# Patient Record
Sex: MALE | Race: BLACK OR AFRICAN AMERICAN | Employment: UNEMPLOYED | URBAN - METROPOLITAN AREA
[De-identification: names, ages, dates, MRNs, and addresses within clinical notes are randomized per-mention and may not be internally consistent; named-entity substitution may affect disease eponyms.]

---

## 2019-11-01 ENCOUNTER — ANESTHESIA EVENT (OUTPATIENT)
Dept: ENDOSCOPY | Age: 64
End: 2019-11-01
Payer: MEDICARE

## 2019-11-01 ENCOUNTER — HOSPITAL ENCOUNTER (OUTPATIENT)
Age: 64
Setting detail: OUTPATIENT SURGERY
Discharge: HOME | End: 2019-11-01
Attending: SPECIALIST | Admitting: SPECIALIST
Payer: MEDICARE

## 2019-11-01 ENCOUNTER — ANESTHESIA (OUTPATIENT)
Dept: ENDOSCOPY | Age: 64
End: 2019-11-01
Payer: MEDICARE

## 2019-11-01 VITALS
SYSTOLIC BLOOD PRESSURE: 115 MMHG | DIASTOLIC BLOOD PRESSURE: 79 MMHG | WEIGHT: 170.86 LBS | BODY MASS INDEX: 25.89 KG/M2 | RESPIRATION RATE: 18 BRPM | HEART RATE: 66 BPM | OXYGEN SATURATION: 100 % | HEIGHT: 68 IN | TEMPERATURE: 97.9 F

## 2019-11-01 PROCEDURE — 74011250636 HC RX REV CODE- 250/636: Performed by: NURSE ANESTHETIST, CERTIFIED REGISTERED

## 2019-11-01 PROCEDURE — 76040000019: Performed by: SPECIALIST

## 2019-11-01 PROCEDURE — 77030013992 HC SNR POLYP ENDOSC BSC -B: Performed by: SPECIALIST

## 2019-11-01 PROCEDURE — 76060000031 HC ANESTHESIA FIRST 0.5 HR: Performed by: SPECIALIST

## 2019-11-01 PROCEDURE — 88305 TISSUE EXAM BY PATHOLOGIST: CPT

## 2019-11-01 PROCEDURE — 74011000250 HC RX REV CODE- 250: Performed by: NURSE ANESTHETIST, CERTIFIED REGISTERED

## 2019-11-01 PROCEDURE — 74011250637 HC RX REV CODE- 250/637: Performed by: PHYSICIAN ASSISTANT

## 2019-11-01 PROCEDURE — 74011250636 HC RX REV CODE- 250/636: Performed by: PHYSICIAN ASSISTANT

## 2019-11-01 RX ORDER — PROPOFOL 10 MG/ML
INJECTION, EMULSION INTRAVENOUS AS NEEDED
Status: DISCONTINUED | OUTPATIENT
Start: 2019-11-01 | End: 2019-11-01 | Stop reason: HOSPADM

## 2019-11-01 RX ORDER — LIDOCAINE HYDROCHLORIDE 20 MG/ML
INJECTION, SOLUTION EPIDURAL; INFILTRATION; INTRACAUDAL; PERINEURAL AS NEEDED
Status: DISCONTINUED | OUTPATIENT
Start: 2019-11-01 | End: 2019-11-01 | Stop reason: HOSPADM

## 2019-11-01 RX ORDER — ATROPINE SULFATE 0.1 MG/ML
0.5 INJECTION INTRAVENOUS
Status: DISCONTINUED | OUTPATIENT
Start: 2019-11-01 | End: 2019-11-01 | Stop reason: HOSPADM

## 2019-11-01 RX ORDER — EPINEPHRINE 0.1 MG/ML
1 INJECTION INTRACARDIAC; INTRAVENOUS
Status: DISCONTINUED | OUTPATIENT
Start: 2019-11-01 | End: 2019-11-01 | Stop reason: HOSPADM

## 2019-11-01 RX ORDER — PROPOFOL 10 MG/ML
INJECTION, EMULSION INTRAVENOUS
Status: DISCONTINUED | OUTPATIENT
Start: 2019-11-01 | End: 2019-11-01 | Stop reason: HOSPADM

## 2019-11-01 RX ORDER — MIDAZOLAM HYDROCHLORIDE 1 MG/ML
.25-5 INJECTION, SOLUTION INTRAMUSCULAR; INTRAVENOUS AS NEEDED
Status: DISCONTINUED | OUTPATIENT
Start: 2019-11-01 | End: 2019-11-01 | Stop reason: HOSPADM

## 2019-11-01 RX ORDER — SODIUM CHLORIDE 9 MG/ML
INJECTION, SOLUTION INTRAVENOUS
Status: DISCONTINUED | OUTPATIENT
Start: 2019-11-01 | End: 2019-11-01 | Stop reason: HOSPADM

## 2019-11-01 RX ORDER — OMEPRAZOLE 20 MG/1
20 CAPSULE, DELAYED RELEASE ORAL DAILY
COMMUNITY

## 2019-11-01 RX ORDER — NALOXONE HYDROCHLORIDE 0.4 MG/ML
0.4 INJECTION, SOLUTION INTRAMUSCULAR; INTRAVENOUS; SUBCUTANEOUS
Status: DISCONTINUED | OUTPATIENT
Start: 2019-11-01 | End: 2019-11-01 | Stop reason: HOSPADM

## 2019-11-01 RX ORDER — FENTANYL CITRATE 50 UG/ML
25 INJECTION, SOLUTION INTRAMUSCULAR; INTRAVENOUS AS NEEDED
Status: DISCONTINUED | OUTPATIENT
Start: 2019-11-01 | End: 2019-11-01 | Stop reason: HOSPADM

## 2019-11-01 RX ORDER — DEXTROMETHORPHAN/PSEUDOEPHED 2.5-7.5/.8
1.2 DROPS ORAL
Status: DISCONTINUED | OUTPATIENT
Start: 2019-11-01 | End: 2019-11-01 | Stop reason: HOSPADM

## 2019-11-01 RX ORDER — SODIUM CHLORIDE 9 MG/ML
50 INJECTION, SOLUTION INTRAVENOUS CONTINUOUS
Status: DISCONTINUED | OUTPATIENT
Start: 2019-11-01 | End: 2019-11-01 | Stop reason: HOSPADM

## 2019-11-01 RX ORDER — FLUMAZENIL 0.1 MG/ML
0.2 INJECTION INTRAVENOUS
Status: DISCONTINUED | OUTPATIENT
Start: 2019-11-01 | End: 2019-11-01 | Stop reason: HOSPADM

## 2019-11-01 RX ORDER — CHOLECALCIFEROL (VITAMIN D3) 125 MCG
1 CAPSULE ORAL DAILY
COMMUNITY
End: 2022-07-18

## 2019-11-01 RX ADMIN — SIMETHICONE 1.2 ML: 20 SUSPENSION/ DROPS ORAL at 07:18

## 2019-11-01 RX ADMIN — SODIUM CHLORIDE 50 ML/HR: 900 INJECTION, SOLUTION INTRAVENOUS at 07:00

## 2019-11-01 RX ADMIN — SODIUM CHLORIDE: 9 INJECTION, SOLUTION INTRAVENOUS at 07:00

## 2019-11-01 RX ADMIN — PROPOFOL 100 MG: 10 INJECTION, EMULSION INTRAVENOUS at 07:11

## 2019-11-01 RX ADMIN — PROPOFOL 120 MCG/KG/MIN: 10 INJECTION, EMULSION INTRAVENOUS at 07:11

## 2019-11-01 RX ADMIN — LIDOCAINE HYDROCHLORIDE 40 MG: 20 INJECTION, SOLUTION INTRAVENOUS at 07:11

## 2019-11-01 NOTE — PERIOP NOTES
0710  Anesthesia staff at patient's bedside administering anesthesia and monitoring patients vital signs throughout procedure. See anesthesia note.    0726  Endoscope was pre-cleaned at bedside immediately following procedure by Malina Denney endo tech. 0730  Patient tolerated procedure without problems. Abdomen soft and patient arousable and voices no complaints Report received from CRNA, see anesthesia note. Patient transported to endoscopy recovery area. Report given to Gibran Sheets RN.

## 2019-11-01 NOTE — ROUTINE PROCESS
Mariama Drake  1955  [id number]    Situation:  Verbal report received from: Alfredo Nelson RN  Procedure: Procedure(s):  COLONOSCOPY  ENDOSCOPIC POLYPECTOMY    Background:    Preoperative diagnosis: SCREENING  Postoperative diagnosis: Polyp    :  Dr. Nohemi Middleton  Assistant(s): Endoscopy Technician-1: John Paul ANDRES  Endoscopy RN-1: Flaca Bahena RN    Specimens:   ID Type Source Tests Collected by Time Destination   1 : transverse colon polyp Preservative Colon, Transverse  Chato Qureshi MD 11/1/2019 0720 Pathology     H. Pylori  no    Assessment:  Intra-procedure medications       Anesthesia gave intra-procedure sedation and medications, see anesthesia flow sheet yes    Intravenous fluids: NS@ KVO     Vital signs stable     Abdominal assessment: round and soft     Recommendation:  Discharge patient per MD order. Family or Friend   Permission to share finding with family or friend yes    Endoscopy discharge instructions have been reviewed and given to patient and spouse. The patient and spouse verbalized understanding and acceptance of instructions.

## 2019-11-01 NOTE — DISCHARGE INSTRUCTIONS
[address] [facility] LANG Casillas MD  [phone]      November 1, 2019    Camilla Gannon  YOB: 1955    COLONOSCOPY DISCHARGE INSTRUCTIONS    If there is redness at IV site you should apply warm compress to area. If redness or soreness persist contact Dr. Alem Casillas' or your primary care doctor. There may be a slight amount of blood passed from the rectum. Gaseous discomfort may develop, but walking, belching will help relieve this. You may not operate a vehicle for 12 hours  You may not operate machinery or dangerous appliances for rest of today  You may not drink alcoholic beverages for 12 hours  Avoid making any critical decisions for 24 hours    DIET:  You may resume your normal diet, but some patients find that heavy or large meals may lead to indigestion or vomiting. I suggest a light meal as first food intake. MEDICATIONS:  The use of some over-the-counter pain medication may lead to bleeding after colon biopsies or polyp removal.  Tylenol (also called acetaminophen) is safe to take even if you have had colonoscopy with polyp removal.  Based on the procedure you had today you may not safely take aspirin or aspirin-like products for the next ten (10) days. Remember that Tylenol (also called acetaminophen) is safe to take after colonoscopy even if you have had biopsies or polyps removed. ACTIVITY:  You may resume your normal household activities, but it is recommended that you spend the remainder of the day resting -  avoid any strenuous activity. CALL DR. Rubin Madrigal' OFFICE IF:  Increasing pain, nausea, vomiting  Abdominal distension (swelling)  Significant new or increased bleeding (oral or rectal)  Fever/Chills  Chest pain/shortness of breath                       Additional instructions:   No aspirin 10 days. We found one small polyp and removed that today.   I will contact you with the polyp results when available. It was an honor to be your doctor today. Please let me or my office staff know if you have any feedback about today's procedure. Ladi Greer MD    Colonoscopy saves lives, and can prevent colon cancer. Everyone aged 48 or older needs colonoscopy. Tell your family and friends: get the test!      Patient Education        Colon Polyps: Care Instructions  Your Care Instructions    Colon polyps are growths in the colon or the rectum. The cause of most colon polyps is not known, and most people who get them do not have any problems. But a certain kind can turn into cancer. For this reason, regular testing for colon polyps is important for people as they get older. It is also important for anyone who has an increased risk for colon cancer. Polyps are usually found through routine colon cancer screening tests. Although most colon polyps are not cancerous, they are usually removed and then tested for cancer. Screening for colon cancer saves lives because the cancer can usually be cured if it is caught early. If you have a polyp that is the type that can turn into cancer, you may need more tests to examine your entire colon. The doctor will remove any other polyps that he or she finds, and you will be tested more often. Follow-up care is a key part of your treatment and safety. Be sure to make and go to all appointments, and call your doctor if you are having problems. It's also a good idea to know your test results and keep a list of the medicines you take. How can you care for yourself at home? Regular exams to look for colon polyps are the best way to prevent polyps from turning into colon cancer. These can include stool tests, sigmoidoscopy, colonoscopy, and CT colonography. Talk with your doctor about a testing schedule that is right for you. To prevent polyps  There is no home treatment that can prevent colon polyps. But these steps may help lower your risk for cancer.   · Stay active. Being active can help you get to and stay at a healthy weight. Try to exercise on most days of the week. Walking is a good choice. · Eat well. Choose a variety of vegetables, fruits, legumes (such as peas and beans), fish, poultry, and whole grains. · Do not smoke. If you need help quitting, talk to your doctor about stop-smoking programs and medicines. These can increase your chances of quitting for good. · If you drink alcohol, limit how much you drink. Limit alcohol to 2 drinks a day for men and 1 drink a day for women. When should you call for help? Call your doctor now or seek immediate medical care if:    · You have severe belly pain.     · Your stools are maroon or very bloody.    Watch closely for changes in your health, and be sure to contact your doctor if:    · You have a fever.     · You have nausea or vomiting.     · You have a change in bowel habits (new constipation or diarrhea).     · Your symptoms get worse or are not improving as expected. Where can you learn more? Go to http://gail-milton.info/. Enter 95 [id number] in the search box to learn more about \"Colon Polyps: Care Instructions. \"  Current as of: December 19, 2018  Content Version: 12.2  © 7457-9777 [employer]. Care instructions adapted under license by [employer] (which disclaims liability or warranty for this information). If you have questions about a medical condition or this instruction, always ask your healthcare professional. [place] [address] any warranty or liability for your use of this information.

## 2019-11-01 NOTE — PROCEDURES
[address] [facility] LANG Delgado MD  [phone]      2019    Colonoscopy Procedure Note  Hardy Beckford  :  1955  Yudy Medical Record Number: [id number]    Indications:     Screening colonoscopy  PCP:  Dafne Melgoza MD  Anesthesia/Sedation: Conscious Sedation/Moderate Sedation/GETA, see notes  Endoscopist:  Dr. Alana Arntet  Complications:  None  Estimated Blood Loss:  None    Permit:  The indications, risks, benefits and alternatives were reviewed with the patient or their decision maker who was provided an opportunity to ask questions and all questions were answered. The specific risks of colonoscopy with conscious sedation were reviewed, including but not limited to anesthetic complication, bleeding, adverse drug reaction, missed lesion, infection, IV site reactions, and intestinal perforation which would lead to the need for surgical repair. Alternatives to colonoscopy including radiographic imaging, observation without testing, or laboratory testing were reviewed including the limitations of those alternatives. After considering the options and having all their questions answered, the patient or their decision maker provided both verbal and written consent to proceed. Procedure in Detail:  After obtaining informed consent, positioning of the patient in the left lateral decubitus position, and conduction of a pre-procedure pause or \"time out\" the endoscope was introduced into the anus and advanced to the cecum, which was identified by the ileocecal valve and appendiceal orifice. The quality of the colonic preparation was good. A careful inspection was made as the colonoscope was withdrawn, findings and interventions are described below. Findings:   6mm polyp in the transvese colon removed with cold snare and all samples retrieved. Hemostasis confirmed.       Specimens: See above    Complications:   None; patient tolerated the procedure well. Impression:  Polyp x1    Recommendations:     - Await pathology. Thank you for entrusting me with this patient's care. Please do not hesitate to contact me with any questions or if I can be of assistance with any of your other patients' GI needs. Signed By: Abagail Jeans, MD                        November 1, 2019      Surgical assistant none. Implants none unless specified.

## 2019-11-01 NOTE — INTERVAL H&P NOTE
Pre-Endoscopy H&P Update  Chief complaint/HPI/ROS:  The indication for the procedure, the patient's history and the patient's current medications are reviewed prior to the procedure and that data is reported on the H&P to which this document is attached. Any significant complaints with regard to organ systems will be noted, and if not mentioned then a review of systems is not contributory. Past Medical History:   Diagnosis Date    Acid reflux     Arthritis     Asthma     GERD (gastroesophageal reflux disease)     High cholesterol     Hypertension     last took medication 2012    Ill-defined condition     high cholesterol-    Other ill-defined conditions(799.89) 2013    pancreatitis- hospitalized at [facility] Other ill-defined conditions(799.89)     hip avascular necrosis- per MRI    Pancreatitis       Past Surgical History:   Procedure Laterality Date   [facility] ORTHOPAEDIC  7-2014    right hip core decompression    HX PROSTATE SURGERY  2016     Social   Social History     Tobacco Use    Smoking status: Never Smoker    Smokeless tobacco: Never Used   Substance Use Topics    Alcohol use: Yes     Alcohol/week: 28.0 standard drinks     Types: 28 Cans of beer per week      Family History   Problem Relation Age of Onset    Diabetes Mother     Hypertension Mother       No Known Allergies   Prior to Admission Medications   Prescriptions Last Dose Informant Patient Reported? Taking? OTHER 10/30/2019  Yes Yes   Sig: Indications: a blood pressure med, doesn't know name   alendronate (FOSAMAX) 10 mg tablet 10/25/2019 at Unknown time  Yes Yes   Sig: Take 70 mg by mouth Every Thursday. aspirin (ASPIRIN) 325 mg tablet Not Taking at Unknown time  No No   Sig: Take 1 tablet by mouth two (2) times a day. cholecalciferol, vitamin D3, (VITAMIN D3) 2,000 unit tab 10/30/2019  Yes No   Sig: Take 1 Tab by mouth daily.    diclofenac potassium (CATAFLAM) 50 mg tablet Not Taking at Unknown time  Yes No   Sig: Take 50 mg by mouth two (2) times a day. folic acid (FOLVITE) 1 mg tablet 10/25/2019 at Unknown time  Yes Yes   Sig: Take 1 mg by mouth daily. omeprazole (PRILOSEC) 20 mg capsule 10/30/2019  Yes Yes   Sig: Take 20 mg by mouth daily. Indications: gastroesophageal reflux disease   oxyCODONE IR (ROXICODONE) 5 mg immediate release tablet 10/28/2019  No No   Sig: Take 1 tablet by mouth every eight (8) hours as needed for Pain. oxyCODONE-acetaminophen (PERCOCET 7.5) 7.5-325 mg per tablet Not Taking at Unknown time  Yes No   Sig: Take 1 tablet by mouth every six (6) hours as needed for Pain. oxyCODONE-acetaminophen (PERCOCET 7.5) 7.5-325 mg per tablet Not Taking at Unknown time  No No   Sig: Take 1-2 tablets by mouth every four (4) hours as needed for Pain. promethazine (PHENERGAN) 25 mg tablet Not Taking at Unknown time  No No   Sig: Take 1 tablet by mouth every six (6) hours as needed for Nausea. silodosin (RAPAFLO) 8 mg capsule Not Taking at Unknown time  Yes No   Sig: Take 8 mg by mouth daily (with breakfast). simvastatin (ZOCOR) 20 mg tablet 10/30/2019  Yes No   Sig: Take 20 mg by mouth nightly. thiamine (VITAMIN B-1) 100 mg tablet Not Taking at Unknown time  Yes No   Sig: Take  by mouth daily. traMADol (ULTRAM) 50 mg tablet   No No   Sig: Take 1 tablet by mouth every six (6) hours as needed for Pain. Facility-Administered Medications: None       PHYSICAL EXAM:  The patient is examined immediately prior to the procedure. Visit Vitals  /80   Pulse 61   Temp 97.9 °F (36.6 °C)   Resp 21   Ht 5' 7.5\" (1.715 m)   Wt 77.5 kg (170 lb 13.7 oz)   SpO2 100%   BMI 26.36 kg/m²     Gen: Appears comfortable, no distress. Pulm: comfortable respirations with no abnormal audible breath sounds  HEART: well perfused, no abnormal audible heart sounds  GI: abdomen flat. PLAN:  Informed consent discussion held, patient afforded an opportunity to ask questions and all questions answered.   After being advised of the risks, benefits, and alternatives, the patient requested that we proceed and indicated so on a written consent form. Will proceed with procedure as planned.   Harriet Pizarro MD

## 2019-11-01 NOTE — ANESTHESIA PREPROCEDURE EVALUATION
Anesthetic History   No history of anesthetic complications            Review of Systems / Medical History  Patient summary reviewed, nursing notes reviewed and pertinent labs reviewed    Pulmonary            Asthma : well controlled       Neuro/Psych   Within defined limits          Comments: Chronic back pain Cardiovascular              Hyperlipidemia    Exercise tolerance: >4 METS     GI/Hepatic/Renal     GERD: well controlled           Endo/Other        Arthritis     Other Findings              Physical Exam    Airway  Mallampati: IV    Neck ROM: normal range of motion   Mouth opening: Normal     Cardiovascular    Rhythm: regular  Rate: normal         Dental    Dentition: Upper partial plate     Pulmonary  Breath sounds clear to auscultation               Abdominal         Other Findings            Anesthetic Plan    ASA: 2  Anesthesia type: MAC            Anesthetic plan and risks discussed with: Patient      Informed consent obtained.

## 2019-11-01 NOTE — ANESTHESIA POSTPROCEDURE EVALUATION
Procedure(s):  COLONOSCOPY  ENDOSCOPIC POLYPECTOMY. MAC    Anesthesia Post Evaluation      Multimodal analgesia: multimodal analgesia not used between 6 hours prior to anesthesia start to PACU discharge  Patient location during evaluation: PACU  Patient participation: complete - patient participated  Level of consciousness: awake and alert  Pain score: 0  Pain management: adequate  Airway patency: patent  Anesthetic complications: no  Cardiovascular status: hemodynamically stable and acceptable  Respiratory status: acceptable  Hydration status: acceptable  Comments: Patient seen and evaluated; no concerns. Post anesthesia nausea and vomiting:  none      Vitals Value Taken Time   /79 11/1/2019  7:50 AM   Temp 36.6 °C (97.9 °F) 11/1/2019  7:33 AM   Pulse 64 11/1/2019  7:52 AM   Resp 19 11/1/2019  7:52 AM   SpO2 100 % 11/1/2019  7:51 AM   Vitals shown include unvalidated device data.

## 2022-07-18 ENCOUNTER — HOSPITAL ENCOUNTER (OUTPATIENT)
Dept: PREADMISSION TESTING | Age: 67
Discharge: HOME | End: 2022-07-18
Payer: MEDICARE

## 2022-07-18 VITALS
SYSTOLIC BLOOD PRESSURE: 125 MMHG | BODY MASS INDEX: 28.53 KG/M2 | OXYGEN SATURATION: 98 % | WEIGHT: 181.8 LBS | HEIGHT: 67 IN | HEART RATE: 90 BPM | TEMPERATURE: 97.8 F | DIASTOLIC BLOOD PRESSURE: 76 MMHG | RESPIRATION RATE: 16 BRPM

## 2022-07-18 LAB
ABO + RH BLD: NORMAL
ALBUMIN SERPL-MCNC: 3.9 G/DL (ref 3.5–5)
ALBUMIN/GLOB SERPL: 1 {RATIO} (ref 1.1–2.2)
ALP SERPL-CCNC: 69 U/L (ref 45–117)
ALT SERPL-CCNC: 43 U/L (ref 12–78)
ANION GAP SERPL CALC-SCNC: 5 MMOL/L (ref 5–15)
APPEARANCE UR: CLEAR
AST SERPL-CCNC: 29 U/L (ref 15–37)
BACTERIA URNS QL MICRO: NEGATIVE /HPF
BASOPHILS # BLD: 0 K/UL (ref 0–0.1)
BASOPHILS NFR BLD: 1 % (ref 0–1)
BILIRUB SERPL-MCNC: 0.5 MG/DL (ref 0.2–1)
BILIRUB UR QL: NEGATIVE
BLOOD GROUP ANTIBODIES SERPL: NORMAL
BUN SERPL-MCNC: 14 MG/DL (ref 6–20)
BUN/CREAT SERPL: 15 (ref 12–20)
CALCIUM SERPL-MCNC: 9.6 MG/DL (ref 8.5–10.1)
CHLORIDE SERPL-SCNC: 107 MMOL/L (ref 97–108)
CO2 SERPL-SCNC: 30 MMOL/L (ref 21–32)
COLOR UR: NORMAL
CREAT SERPL-MCNC: 0.94 MG/DL (ref 0.7–1.3)
DIFFERENTIAL METHOD BLD: NORMAL
EOSINOPHIL # BLD: 0.2 K/UL (ref 0–0.4)
EOSINOPHIL NFR BLD: 3 % (ref 0–7)
EPITH CASTS URNS QL MICRO: NORMAL /LPF
ERYTHROCYTE [DISTWIDTH] IN BLOOD BY AUTOMATED COUNT: 13.6 % (ref 11.5–14.5)
GLOBULIN SER CALC-MCNC: 3.8 G/DL (ref 2–4)
GLUCOSE SERPL-MCNC: 93 MG/DL (ref 65–100)
GLUCOSE UR STRIP.AUTO-MCNC: NEGATIVE MG/DL
HCT VFR BLD AUTO: 43.8 % (ref 36.6–50.3)
HGB BLD-MCNC: 14.6 G/DL (ref 12.1–17)
HGB UR QL STRIP: NEGATIVE
HYALINE CASTS URNS QL MICRO: NORMAL /LPF (ref 0–2)
IMM GRANULOCYTES # BLD AUTO: 0 K/UL (ref 0–0.04)
IMM GRANULOCYTES NFR BLD AUTO: 0 % (ref 0–0.5)
KETONES UR QL STRIP.AUTO: NEGATIVE MG/DL
LEUKOCYTE ESTERASE UR QL STRIP.AUTO: NEGATIVE
LYMPHOCYTES # BLD: 2.5 K/UL (ref 0.8–3.5)
LYMPHOCYTES NFR BLD: 41 % (ref 12–49)
MCH RBC QN AUTO: 31.4 PG (ref 26–34)
MCHC RBC AUTO-ENTMCNC: 33.3 G/DL (ref 30–36.5)
MCV RBC AUTO: 94.2 FL (ref 80–99)
MONOCYTES # BLD: 0.5 K/UL (ref 0–1)
MONOCYTES NFR BLD: 8 % (ref 5–13)
NEUTS SEG # BLD: 2.8 K/UL (ref 1.8–8)
NEUTS SEG NFR BLD: 47 % (ref 32–75)
NITRITE UR QL STRIP.AUTO: NEGATIVE
NRBC # BLD: 0 K/UL (ref 0–0.01)
NRBC BLD-RTO: 0 PER 100 WBC
PH UR STRIP: 5.5 [PH] (ref 5–8)
PLATELET # BLD AUTO: 308 K/UL (ref 150–400)
PMV BLD AUTO: 9.3 FL (ref 8.9–12.9)
POTASSIUM SERPL-SCNC: 4.4 MMOL/L (ref 3.5–5.1)
PROT SERPL-MCNC: 7.7 G/DL (ref 6.4–8.2)
PROT UR STRIP-MCNC: NEGATIVE MG/DL
RBC # BLD AUTO: 4.65 M/UL (ref 4.1–5.7)
RBC #/AREA URNS HPF: NORMAL /HPF (ref 0–5)
SODIUM SERPL-SCNC: 142 MMOL/L (ref 136–145)
SP GR UR REFRACTOMETRY: 1.02 (ref 1–1.03)
SPECIMEN EXP DATE BLD: NORMAL
UA: UC IF INDICATED,UAUC: NORMAL
UROBILINOGEN UR QL STRIP.AUTO: 0.2 EU/DL (ref 0.2–1)
WBC # BLD AUTO: 6 K/UL (ref 4.1–11.1)
WBC URNS QL MICRO: NORMAL /HPF (ref 0–4)

## 2022-07-18 PROCEDURE — 85025 COMPLETE CBC W/AUTO DIFF WBC: CPT

## 2022-07-18 PROCEDURE — 81001 URINALYSIS AUTO W/SCOPE: CPT

## 2022-07-18 PROCEDURE — 80053 COMPREHEN METABOLIC PANEL: CPT

## 2022-07-18 PROCEDURE — 93005 ELECTROCARDIOGRAM TRACING: CPT

## 2022-07-18 PROCEDURE — 36415 COLL VENOUS BLD VENIPUNCTURE: CPT

## 2022-07-18 PROCEDURE — 86900 BLOOD TYPING SEROLOGIC ABO: CPT

## 2022-07-18 RX ORDER — ACETAMINOPHEN 500 MG
2000 TABLET ORAL DAILY
COMMUNITY

## 2022-07-18 RX ORDER — TAMSULOSIN HYDROCHLORIDE 0.4 MG/1
0.4 CAPSULE ORAL EVERY EVENING
COMMUNITY
End: 2022-09-14

## 2022-07-18 RX ORDER — HYDROCHLOROTHIAZIDE 12.5 MG/1
12.5 CAPSULE ORAL DAILY
COMMUNITY
End: 2022-08-16

## 2022-07-18 NOTE — H&P
History and Physical    Patient: Ryan Epperson MRN: [id number]  SSN: [id number]    YOB: 1955  Age: 74 y.o. Sex: male      CC: Prostate cancer    Subjective:      Ryan Epperson is a 74 y.o. male referred for pre-operative evaluation by Dr. Lakisha Vincent for surgery on 8/8/22. Waldemar Montes notes that he was diagnosed with prostate cancer in May following a biopsy. Denies pain or urinary difficulty. The patient was evaluated in the surgeon's office and it was determined that the most appropriate plan of care is to proceed with surgical intervention. Patient's PCP Wil Arambula MD    Past Medical History:   Diagnosis Date    Arthritis     Asthma due to seasonal allergies     [facility]N [facility]avascular necrosis of bone) ([facility] [address] 2014    COVID-19 vaccine series completed     GERD (gastroesophageal reflux disease)     High cholesterol     Hypertension 2012    Pancreatitis     Prostate CA ([facility] [address] 05/2022     Past Surgical History:   Procedure Laterality Date    COLONOSCOPY N/A 11/1/2019    COLONOSCOPY performed by Severino Pollock MD at [address] [place] [address] HX HIP REPLACEMENT Right 07/2014    HX PROSTATE SURGERY  2016    laser therapy for enlarged prostate      Family History   Problem Relation Age of Onset    Diabetes Mother     Hypertension Mother     Prostate Cancer Father      Social History     Tobacco Use    Smoking status: Never Smoker    Smokeless tobacco: Never Used   Substance Use Topics    Alcohol use: Yes     Alcohol/week: 28.0 standard drinks     Types: 28 Cans of beer per week    Drug use: No      Prior to Admission medications    Medication Sig Start Date End Date Taking? Authorizing Provider   hydroCHLOROthiazide (MICROZIDE) 12.5 mg capsule Take 12.5 mg by mouth daily. Yes Provider, Historical   tamsulosin (FLOMAX) 0.4 mg capsule Take 0.4 mg by mouth every evening.    Yes Provider, Historical   cholecalciferol (VITAMIN D3) (2,000 UNITS /50 MCG) cap capsule Take 2,000 Units by mouth daily. Yes Provider, Historical   oxycodone HCl (OXYCODONE PO) Take 1 Tablet by mouth as needed. Yes Provider, Historical   omeprazole (PRILOSEC) 20 mg capsule Take 20 mg by mouth daily. Indications: gastroesophageal reflux disease   Yes Provider, Historical   simvastatin (ZOCOR) 20 mg tablet Take 20 mg by mouth nightly. Yes Provider, Historical   folic acid (FOLVITE) 1 mg tablet Take 1 mg by mouth daily. Yes Provider, Historical        No Known Allergies    Review of Systems:  Constitutional: Negative for chills and fever  HENT: Negative for congestion and sore throat  Eyes: negative for blurred vision and double vision  Respiratory: Negative for cough, shortness of breath and wheezing  Mouth: Negative for loose, broken or chipped teeth. Cardiovascular: Negative for chest pain and palpitations  Gastrointestinal: Negative for abdominal pain, constipation, diarrhea and nausea  Genitourinary: Negative for dysuria and hematuria  Musculoskeletal: Negative for pain  Skin: Negative for rash, open wounds.    Neurological: Negative for dizziness, tremors and headaches  Psychiatric: Negative for anxiety    Objective:     Vitals:    07/18/22 1504   BP: 125/76   Pulse: 90   Resp: 16   Temp: 97.8 °F (36.6 °C)   SpO2: 98%   Weight: 82.5 kg (181 lb 12.8 oz)   Height: 5' 7\" (1.702 m)        Physical Exam:  General Appearance: Alert, Well Appearing and in no distress  Mental Status: Normal mood, behavior, speech and dress  Neck: Normal appearance externally  Ears: External canal no drainage  Nose: Normal external appearance and no drainage   Chest: Clear to auscultation, no wheezes, rales or rhonchi  Heart: Normal rate, regular rhythm, no murmurs, rubs, clicks or gallops  Skin: Normal color, no lesions externally  Abdomen: Not examined  Neuro: Not examined  Musculoskeletal: Normal gait      Assessment:     Prostate cancer  Pre-Operative Evaluation    Plan:     Prostatectomy   All labs and EKG pending    Signed By: Erick Márquez Yuliya Luis NP     July 18, 2022

## 2022-07-18 NOTE — PERIOP NOTES
[address] [place] [address] [address], [address] [facility]   MAIN OR                                  [phone]   MAIN PRE OP                          [phone]                                                                                AMBULATORY PRE OP          [phone]  PRE-ADMISSION TESTING    [phone]     Surgery Date:  8/8/2022 Monday      Is surgery arrival time given by surgeon? NO  If NO, [facility] staff will call you between 3 and 7pm the day before your surgery with your arrival time. (If your surgery is on a Monday, we will call you the Friday before.)    Call [phone] after 7pm Monday-Friday if you did not receive this call. INSTRUCTIONS BEFORE YOUR SURGERY   When You  Arrive Arrive at the 2nd [address] [facility] on the day of your surgery  Have your insurance card, photo ID, and any copayment (if needed)   Food   and   Drink NO food or drink after midnight the night before surgery    This means NO water, gum, mints, coffee, juice, etc.  No alcohol (beer, wine, liquor) 24 hours before and after surgery   Medications to   TAKE   Morning of Surgery MEDICATIONS TO TAKE THE MORNING OF SURGERY WITH A SIP OF WATER:    Oxycodone if needed   Omeprazole     Medications  To  STOP      7 days before surgery  Non-Steroidal anti-inflammatory Drugs (NSAID's): for example, Ibuprofen (Advil, Motrin), Naproxen (Aleve)   Aspirin, if taking for pain    Herbal supplements, vitamins, and fish oil   Other:  (Pain medications not listed above, including Tylenol may be taken)   Blood  Thinners  If you take  Aspirin, Plavix, Coumadin, or any blood-thinning or anti-blood clot medicine, talk to the doctor who prescribed the medications for pre-operative instructions.    Bathing Clothing  Jewelry  Valuables      If you shower the morning of surgery, please do not apply anything to your skin (lotions, powders, deodorant, or makeup, especially martha)   Follow [employer] body wash instructions provided to you during PAT appointment. Begin 3 days prior to surgery.  Do not shave or trim anywhere 24 hours before surgery   Wear your hair loose or down; no pony-tails, buns, or metal hair clips   Wear loose, comfortable, clean clothes   Wear glasses instead of contacts  [employer] money, valuables, and jewelry, including body piercings, at home   If you were given an [employer], bring it on day of surgery. Going Home - or Spending the Night  SAME-DAY SURGERY: You must have a responsible adult drive you home and stay with you 24 hours after surgery   ADMITS: If your doctor is keeping you in the hospital after surgery, leave personal belongings/luggage in your car until you have a hospital room number. Hospital discharge time is 12 noon  Drivers must be here before 12 noon unless you are told differently   Special Instructions      Follow all instructions so your surgery wont be cancelled. Please, be on time. If a situation occurs and you are delayed the day of surgery, call [phone] . If your physical condition changes (like a fever, cold, flu, etc.) call your surgeon. Home medication(s) reviewed and verified verbally with list during PAT appointment. The patient was contacted in person. The patient verbalizes understanding of all instructions and does not need reinforcement.

## 2022-07-19 LAB
ATRIAL RATE: 67 BPM
CALCULATED P AXIS, ECG09: 59 DEGREES
CALCULATED R AXIS, ECG10: 52 DEGREES
CALCULATED T AXIS, ECG11: 46 DEGREES
DIAGNOSIS, 93000: NORMAL
P-R INTERVAL, ECG05: 138 MS
Q-T INTERVAL, ECG07: 392 MS
QRS DURATION, ECG06: 84 MS
QTC CALCULATION (BEZET), ECG08: 414 MS
VENTRICULAR RATE, ECG03: 67 BPM

## 2022-08-16 ENCOUNTER — HOSPITAL ENCOUNTER (OUTPATIENT)
Dept: PREADMISSION TESTING | Age: 67
Discharge: HOME | End: 2022-08-16
Payer: MEDICARE

## 2022-08-16 VITALS
HEART RATE: 73 BPM | SYSTOLIC BLOOD PRESSURE: 143 MMHG | TEMPERATURE: 97.1 F | DIASTOLIC BLOOD PRESSURE: 81 MMHG | RESPIRATION RATE: 16 BRPM | HEIGHT: 68 IN | WEIGHT: 179.9 LBS | BODY MASS INDEX: 27.26 KG/M2 | OXYGEN SATURATION: 98 %

## 2022-08-16 LAB
ABO + RH BLD: NORMAL
APPEARANCE UR: CLEAR
BACTERIA URNS QL MICRO: NEGATIVE /HPF
BILIRUB UR QL: NEGATIVE
BLOOD GROUP ANTIBODIES SERPL: NORMAL
COLOR UR: NORMAL
EPITH CASTS URNS QL MICRO: NORMAL /LPF
GLUCOSE UR STRIP.AUTO-MCNC: NEGATIVE MG/DL
HGB UR QL STRIP: NEGATIVE
HYALINE CASTS URNS QL MICRO: NORMAL /LPF (ref 0–2)
KETONES UR QL STRIP.AUTO: NEGATIVE MG/DL
LEUKOCYTE ESTERASE UR QL STRIP.AUTO: NEGATIVE
NITRITE UR QL STRIP.AUTO: NEGATIVE
PH UR STRIP: 5 [PH] (ref 5–8)
PROT UR STRIP-MCNC: NEGATIVE MG/DL
RBC #/AREA URNS HPF: NORMAL /HPF (ref 0–5)
SP GR UR REFRACTOMETRY: 1.02 (ref 1–1.03)
SPECIMEN EXP DATE BLD: NORMAL
UA: UC IF INDICATED,UAUC: NORMAL
UROBILINOGEN UR QL STRIP.AUTO: 0.2 EU/DL (ref 0.2–1)
WBC URNS QL MICRO: NORMAL /HPF (ref 0–4)

## 2022-08-16 PROCEDURE — 86900 BLOOD TYPING SEROLOGIC ABO: CPT

## 2022-08-16 PROCEDURE — 36415 COLL VENOUS BLD VENIPUNCTURE: CPT

## 2022-08-16 PROCEDURE — 81001 URINALYSIS AUTO W/SCOPE: CPT

## 2022-08-16 NOTE — PERIOP NOTES
[address] , [address] [facility]   MAIN OR                                  [phone]   MAIN PRE OP                          [phone]                                                                                AMBULATORY PRE OP          [phone]  PRE-ADMISSION TESTING    [phone]   Surgery Date:  Tuesday 9/13/22       Is surgery arrival time given by surgeon? YES  NO  If NO, [facility] staff will call you between 3 and 7pm the day before your surgery with your arrival time. (If your surgery is on a Monday, we will call you the Friday before.)    Call [phone] after 7pm Monday-Friday if you did not receive this call. INSTRUCTIONS BEFORE YOUR SURGERY   When You  Arrive Arrive at the [address] [facility] on the day of your surgery  Have your insurance card, photo ID, and any copayment (if needed)   Food   and   Drink NO food or drink after midnight the night before surgery    This means NO water, gum, mints, coffee, juice, etc.  No alcohol (beer, wine, liquor) 24 hours before and after surgery   Medications to   TAKE   Morning of Surgery MEDICATIONS TO TAKE THE MORNING OF SURGERY WITH A SIP OF WATER:      Medications  To  STOP      7 days before surgery Non-Steroidal anti-inflammatory Drugs (NSAID's): for example, Ibuprofen (Advil, Motrin), Naproxen (Aleve)  Aspirin, if taking for pain   Herbal supplements, vitamins, and fish oil  Other:  (Pain medications not listed above, including Tylenol may be taken)   Blood  Thinners If you take  Aspirin, Plavix, Coumadin, or any blood-thinning or anti-blood clot medicine, talk to the doctor who prescribed the medications for pre-operative instructions.    Bathing Clothing  Jewelry  Valuables     If you shower the morning of surgery, please do not apply anything to your skin (lotions, powders, deodorant, or makeup, especially mascara)  Follow Chlor[employer] body wash instructions provided to you during PAT appointment. Begin 3 days prior to surgery. Do not shave or trim anywhere 24 hours before surgery  Wear your hair loose or down; no pony-tails, buns, or metal hair clips  Wear loose, comfortable, clean clothes  Wear glasses instead of contacts  Leave money, valuables, and jewelry, including body piercings, at home  If you were given an [employer], bring it on day of surgery. Going Home - or Spending the Night SAME-DAY SURGERY: You must have a responsible adult drive you home and stay with you 24 hours after surgery  ADMITS: If your doctor is keeping you in the hospital after surgery, leave personal belongings/luggage in your car until you have a hospital room number. Hospital discharge time is 12 noon  Drivers must be here before 12 noon unless you are told differently   Special Instructions Please bring covid vaccine card day of surgery       Follow all instructions so your surgery wont be cancelled. Please, be on time. If a situation occurs and you are delayed the day of surgery, call [phone]     If your physical condition changes (like a fever, cold, flu, etc.) call your surgeon. Home medication(s) reviewed and verified via     LIST   VERBAL   during PAT appointment. The patient was contacted by     IN-PERSON  The patient verbalizes understanding of all instructions and     DOES NOT   need reinforcement.

## 2022-08-16 NOTE — H&P
History and Physical    Patient: Rizwana Chairez MRN: [id number]  SSN: [id number]    YOB: 1955  Age: 74 y.o. Sex: male      CC: Prostate cancer    Subjective:      Rizwana Chairez is a 74 y.o. male referred for pre-operative evaluation by Dr. Amanda Gay for surgery on 9/13/22. Pepper Putnam was here in July but had to cancel surgery because he got COVID on 8/6/22. He notes only symptoms was a cough but he is much better now. No new health changes since I saw him last.       Previous visit 7/18/22:  Rizwana Chairez is a 74 y.o. male referred for pre-operative evaluation by Dr. Amanda Gay for surgery on 8/8/22. Pepper Putnam notes that he was diagnosed with prostate cancer in May following a biopsy. Denies pain or urinary difficulty. The patient was evaluated in the surgeon's office and it was determined that the most appropriate plan of care is to proceed with surgical intervention. Patient's PCP Louann Frazier MD    Past Medical History:   Diagnosis Date    Arthritis     Asthma due to seasonal allergies     AVN (avascular necrosis of bone) ([address] 2014    COVID-19 08/06/2022    COVID-19 vaccine series completed     GERD (gastroesophageal reflux disease)     High cholesterol     Hypertension 2012    Pancreatitis     Prostate CA ([address] 05/2022     Past Surgical History:   Procedure Laterality Date    COLONOSCOPY N/A 11/1/2019    COLONOSCOPY performed by Nghia Teran MD at [address] [facility] [address] Right 07/2014    HX PROSTATE SURGERY  2016    laser therapy for enlarged prostate      Family History   Problem Relation Age of Onset    Diabetes Mother     Hypertension Mother     Prostate Cancer Father      Social History     Tobacco Use    Smoking status: Never    Smokeless tobacco: Never   Substance Use Topics    Alcohol use: Yes     Alcohol/week: 28.0 standard drinks     Types: 28 Cans of beer per week    Drug use: No      Prior to Admission medications    Medication Sig Start Date End Date Taking? Authorizing Provider   tamsulosin (FLOMAX) 0.4 mg capsule Take 0.4 mg by mouth every evening. Yes Provider, Historical   cholecalciferol (VITAMIN D3) (2,000 UNITS /50 MCG) cap capsule Take 2,000 Units by mouth daily. Yes Provider, Historical   oxycodone HCl (OXYCODONE PO) Take 1 Tablet by mouth as needed. Yes Provider, Historical   omeprazole (PRILOSEC) 20 mg capsule Take 20 mg by mouth daily. Indications: gastroesophageal reflux disease   Yes Provider, Historical   simvastatin (ZOCOR) 20 mg tablet Take 20 mg by mouth nightly. Yes Provider, Historical   folic acid (FOLVITE) 1 mg tablet Take 1 mg by mouth daily. Yes Provider, Historical        No Known Allergies    Review of Systems:  Constitutional: Negative for chills and fever  HENT: Negative for congestion and sore throat  Eyes: negative for blurred vision and double vision  Respiratory: Negative for cough, shortness of breath and wheezing  Mouth: Negative for loose, broken or chipped teeth. Cardiovascular: Negative for chest pain and palpitations  Gastrointestinal: Negative for abdominal pain, constipation, diarrhea and nausea  Genitourinary: Negative for dysuria and hematuria  Musculoskeletal: Negative for joint pain  Skin: Negative for rash, open wounds.    Neurological: Negative for dizziness, tremors and headaches  Psychiatric: Negative for anxiety    Objective:     Vitals:    08/16/22 1316   BP: (!) 143/81   Pulse: 73   Resp: 16   Temp: 97.1 °F (36.2 °C)   SpO2: 98%   Weight: 81.6 kg (179 lb 14.3 oz)   Height: 5' 7.5\" (1.715 m)        Physical Exam:  General Appearance: Alert, Well Appearing and in no distress  Mental Status: Normal mood, behavior, speech and dress  Neck: Normal appearance externally  Ears: External canal no drainage  Nose: Normal external appearance and no drainage   Chest: Clear to auscultation, no wheezes, rales or rhonchi  Heart: Normal rate, regular rhythm, no murmurs, rubs, clicks or gallops  Skin: Normal color, no lesions externally  Abdomen: Not examined  Neuro: Not examined  Musculoskeletal: Normal gait    Recent Results (from the past 168 hour(s))   TYPE & SCREEN    Collection Time: 08/16/22  1:43 PM   Result Value Ref Range    Crossmatch Expiration 08/19/2022,2359     ABO/Rh(D) AB POSITIVE     Antibody screen NEG    URINALYSIS W/ REFLEX CULTURE    Collection Time: 08/16/22  1:43 PM    Specimen: Urine   Result Value Ref Range    Color YELLOW/STRAW      Appearance CLEAR CLEAR      Specific gravity 1.018 1.003 - 1.030      pH (UA) 5.0 5.0 - 8.0      Protein Negative NEG mg/dL    Glucose Negative NEG mg/dL    Ketone Negative NEG mg/dL    Bilirubin Negative NEG      Blood Negative NEG      Urobilinogen 0.2 0.2 - 1.0 EU/dL    Nitrites Negative NEG      Leukocyte Esterase Negative NEG      UA:UC IF INDICATED CULTURE NOT INDICATED BY UA RESULT CNI      WBC 0-4 0 - 4 /hpf    RBC 0-5 0 - 5 /hpf    Epithelial cells FEW FEW /lpf    Bacteria Negative NEG /hpf    Hyaline cast 0-2 0 - 2 /lpf         Assessment:     Prostate cancer  Pre-Operative Evaluation    Plan:     Prostatectomy  Not all labs, CXR or EKG repeated.    Urine and Type and Screen reviewed      Signed By: Alis Smith NP     August 17, 2022

## 2022-08-16 NOTE — PERIOP NOTES
[address] August 16, 2022       RE: Nicholas Roy      To Whom It May Concern,    This is to certify that Nicholas Roy may may return to work on 8/17/22. Please feel free to contact my office if you have any questions or concerns. Thank you for your assistance in this matter.       Sincerely,  Nikos Valdez RN

## 2022-09-12 ENCOUNTER — ANESTHESIA EVENT (OUTPATIENT)
Dept: SURGERY | Age: 67
End: 2022-09-12
Payer: MEDICARE

## 2022-09-13 ENCOUNTER — HOSPITAL ENCOUNTER (OUTPATIENT)
Age: 67
Setting detail: OBSERVATION
Discharge: HOME | End: 2022-09-14
Attending: UROLOGY | Admitting: UROLOGY
Payer: MEDICARE

## 2022-09-13 ENCOUNTER — ANESTHESIA (OUTPATIENT)
Dept: SURGERY | Age: 67
End: 2022-09-13
Payer: MEDICARE

## 2022-09-13 PROBLEM — C61 PROSTATE CANCER (HCC): Status: ACTIVE | Noted: 2022-09-13

## 2022-09-13 LAB
ABO + RH BLD: NORMAL
ANION GAP SERPL CALC-SCNC: 5 MMOL/L (ref 5–15)
BLOOD GROUP ANTIBODIES SERPL: NORMAL
BUN SERPL-MCNC: 12 MG/DL (ref 6–20)
BUN/CREAT SERPL: 15 (ref 12–20)
CALCIUM SERPL-MCNC: 8.4 MG/DL (ref 8.5–10.1)
CHLORIDE SERPL-SCNC: 107 MMOL/L (ref 97–108)
CO2 SERPL-SCNC: 27 MMOL/L (ref 21–32)
CREAT SERPL-MCNC: 0.79 MG/DL (ref 0.7–1.3)
ERYTHROCYTE [DISTWIDTH] IN BLOOD BY AUTOMATED COUNT: 13.5 % (ref 11.5–14.5)
GLUCOSE SERPL-MCNC: 110 MG/DL (ref 65–100)
HCT VFR BLD AUTO: 38.6 % (ref 36.6–50.3)
HGB BLD-MCNC: 13 G/DL (ref 12.1–17)
MCH RBC QN AUTO: 31.7 PG (ref 26–34)
MCHC RBC AUTO-ENTMCNC: 33.7 G/DL (ref 30–36.5)
MCV RBC AUTO: 94.1 FL (ref 80–99)
NRBC # BLD: 0 K/UL (ref 0–0.01)
NRBC BLD-RTO: 0 PER 100 WBC
PLATELET # BLD AUTO: 297 K/UL (ref 150–400)
PMV BLD AUTO: 9.3 FL (ref 8.9–12.9)
POTASSIUM SERPL-SCNC: 4 MMOL/L (ref 3.5–5.1)
RBC # BLD AUTO: 4.1 M/UL (ref 4.1–5.7)
SODIUM SERPL-SCNC: 139 MMOL/L (ref 136–145)
SPECIMEN EXP DATE BLD: NORMAL
WBC # BLD AUTO: 4.2 K/UL (ref 4.1–11.1)

## 2022-09-13 PROCEDURE — 74011000250 HC RX REV CODE- 250: Performed by: UROLOGY

## 2022-09-13 PROCEDURE — 76060000038 HC ANESTHESIA 3.5 TO 4 HR: Performed by: UROLOGY

## 2022-09-13 PROCEDURE — 74011250636 HC RX REV CODE- 250/636: Performed by: UROLOGY

## 2022-09-13 PROCEDURE — 77030020747 HC TU INSUF ENDOSC TELE -A: Performed by: UROLOGY

## 2022-09-13 PROCEDURE — 77030011808 HC STPLR ENDOSCOPIC J&J -D: Performed by: UROLOGY

## 2022-09-13 PROCEDURE — 77030020704 HC DISECT ENDOSC BLNT J&J -B: Performed by: UROLOGY

## 2022-09-13 PROCEDURE — 77030031139 HC SUT VCRL2 J&J -A: Performed by: UROLOGY

## 2022-09-13 PROCEDURE — 77030008756 HC TU IRR SUC STRY -B: Performed by: UROLOGY

## 2022-09-13 PROCEDURE — 77030010507 HC ADH SKN DERMBND J&J -B: Performed by: UROLOGY

## 2022-09-13 PROCEDURE — 77030040831 HC BAG URINE DRNG MDII -A: Performed by: UROLOGY

## 2022-09-13 PROCEDURE — 74011000250 HC RX REV CODE- 250: Performed by: NURSE ANESTHETIST, CERTIFIED REGISTERED

## 2022-09-13 PROCEDURE — 86900 BLOOD TYPING SEROLOGIC ABO: CPT

## 2022-09-13 PROCEDURE — 76010000879 HC OR TIME 3.5 TO 4HR INTENSV - TIER 2: Performed by: UROLOGY

## 2022-09-13 PROCEDURE — 36415 COLL VENOUS BLD VENIPUNCTURE: CPT

## 2022-09-13 PROCEDURE — 77030037032 HC INSRT SCIS CLICKLLINE DISP STOR -B: Performed by: UROLOGY

## 2022-09-13 PROCEDURE — 74011250636 HC RX REV CODE- 250/636: Performed by: NURSE ANESTHETIST, CERTIFIED REGISTERED

## 2022-09-13 PROCEDURE — 77030040504 HC DRN WND MDII -B: Performed by: UROLOGY

## 2022-09-13 PROCEDURE — 77030040922 HC BLNKT HYPOTHRM STRY -A

## 2022-09-13 PROCEDURE — 77030020703 HC SEAL CANN DISP INTU -B: Performed by: UROLOGY

## 2022-09-13 PROCEDURE — 74011250636 HC RX REV CODE- 250/636: Performed by: ANESTHESIOLOGY

## 2022-09-13 PROCEDURE — 77030009851 HC PCH RTVR ENDOSC AMR -B: Performed by: UROLOGY

## 2022-09-13 PROCEDURE — 80048 BASIC METABOLIC PNL TOTAL CA: CPT

## 2022-09-13 PROCEDURE — G0378 HOSPITAL OBSERVATION PER HR: HCPCS

## 2022-09-13 PROCEDURE — 77030035029 HC NDL INSUF VERES DISP COVD -B: Performed by: UROLOGY

## 2022-09-13 PROCEDURE — 74011250637 HC RX REV CODE- 250/637: Performed by: UROLOGY

## 2022-09-13 PROCEDURE — 77030008771 HC TU NG SALEM SUMP -A: Performed by: NURSE ANESTHETIST, CERTIFIED REGISTERED

## 2022-09-13 PROCEDURE — 76210000000 HC OR PH I REC 2 TO 2.5 HR: Performed by: UROLOGY

## 2022-09-13 PROCEDURE — 77030039283 HC SHR HARM INSRT DISP DAVNC INTU -F: Performed by: UROLOGY

## 2022-09-13 PROCEDURE — 77030026438 HC STYL ET INTUB CARD -A: Performed by: NURSE ANESTHETIST, CERTIFIED REGISTERED

## 2022-09-13 PROCEDURE — 77030028270 HC SRGFL HEMSTAT MTRX J&J -C: Performed by: UROLOGY

## 2022-09-13 PROCEDURE — 77030040830 HC CATH URETH FOL MDII -A: Performed by: UROLOGY

## 2022-09-13 PROCEDURE — 77030013079 HC BLNKT BAIR HGGR 3M -A: Performed by: NURSE ANESTHETIST, CERTIFIED REGISTERED

## 2022-09-13 PROCEDURE — 77030002916 HC SUT ETHLN J&J -A: Performed by: UROLOGY

## 2022-09-13 PROCEDURE — 88309 TISSUE EXAM BY PATHOLOGIST: CPT

## 2022-09-13 PROCEDURE — 77030018390 HC SPNG HEMSTAT2 J&J -B: Performed by: UROLOGY

## 2022-09-13 PROCEDURE — 77030008462 HC STPLR SKN PROX J&J -A: Performed by: UROLOGY

## 2022-09-13 PROCEDURE — 77030010935 HC CLP LIG ABSRB TELE -B: Performed by: UROLOGY

## 2022-09-13 PROCEDURE — 77030022704 HC SUT VLOC COVD -B: Performed by: UROLOGY

## 2022-09-13 PROCEDURE — 77030040506 HC DRN WND MDII -A: Performed by: UROLOGY

## 2022-09-13 PROCEDURE — 2709999900 HC NON-CHARGEABLE SUPPLY: Performed by: UROLOGY

## 2022-09-13 PROCEDURE — 85027 COMPLETE CBC AUTOMATED: CPT

## 2022-09-13 PROCEDURE — 77030008684 HC TU ET CUF COVD -B: Performed by: NURSE ANESTHETIST, CERTIFIED REGISTERED

## 2022-09-13 PROCEDURE — 77030036731 HC STPLR ENDOSC J&J -F: Performed by: UROLOGY

## 2022-09-13 PROCEDURE — 77030040361 HC SLV COMPR DVT MDII -B

## 2022-09-13 RX ORDER — LIDOCAINE 4 G/100G
1 PATCH TOPICAL EVERY 24 HOURS
Status: DISCONTINUED | OUTPATIENT
Start: 2022-09-13 | End: 2022-09-14 | Stop reason: HOSPADM

## 2022-09-13 RX ORDER — GLYCOPYRROLATE 0.2 MG/ML
INJECTION INTRAMUSCULAR; INTRAVENOUS AS NEEDED
Status: DISCONTINUED | OUTPATIENT
Start: 2022-09-13 | End: 2022-09-13 | Stop reason: HOSPADM

## 2022-09-13 RX ORDER — SODIUM CHLORIDE, SODIUM LACTATE, POTASSIUM CHLORIDE, CALCIUM CHLORIDE 600; 310; 30; 20 MG/100ML; MG/100ML; MG/100ML; MG/100ML
125 INJECTION, SOLUTION INTRAVENOUS CONTINUOUS
Status: DISCONTINUED | OUTPATIENT
Start: 2022-09-13 | End: 2022-09-13 | Stop reason: HOSPADM

## 2022-09-13 RX ORDER — SODIUM CHLORIDE 0.9 % (FLUSH) 0.9 %
5-40 SYRINGE (ML) INJECTION AS NEEDED
Status: DISCONTINUED | OUTPATIENT
Start: 2022-09-13 | End: 2022-09-13 | Stop reason: HOSPADM

## 2022-09-13 RX ORDER — ATORVASTATIN CALCIUM 10 MG/1
10 TABLET, FILM COATED ORAL DAILY
Status: DISCONTINUED | OUTPATIENT
Start: 2022-09-14 | End: 2022-09-14 | Stop reason: HOSPADM

## 2022-09-13 RX ORDER — DEXAMETHASONE SODIUM PHOSPHATE 4 MG/ML
INJECTION, SOLUTION INTRA-ARTICULAR; INTRALESIONAL; INTRAMUSCULAR; INTRAVENOUS; SOFT TISSUE AS NEEDED
Status: DISCONTINUED | OUTPATIENT
Start: 2022-09-13 | End: 2022-09-13 | Stop reason: HOSPADM

## 2022-09-13 RX ORDER — PROPOFOL 10 MG/ML
INJECTION, EMULSION INTRAVENOUS AS NEEDED
Status: DISCONTINUED | OUTPATIENT
Start: 2022-09-13 | End: 2022-09-13 | Stop reason: HOSPADM

## 2022-09-13 RX ORDER — PHENYLEPHRINE HCL IN 0.9% NACL 0.4MG/10ML
SYRINGE (ML) INTRAVENOUS AS NEEDED
Status: DISCONTINUED | OUTPATIENT
Start: 2022-09-13 | End: 2022-09-13 | Stop reason: HOSPADM

## 2022-09-13 RX ORDER — CIPROFLOXACIN 500 MG/1
500 TABLET ORAL EVERY 12 HOURS
Qty: 3 TABLET | Refills: 0 | Status: SHIPPED | OUTPATIENT
Start: 2022-09-13 | End: 2022-09-15

## 2022-09-13 RX ORDER — MORPHINE SULFATE 2 MG/ML
2 INJECTION, SOLUTION INTRAMUSCULAR; INTRAVENOUS
Status: DISCONTINUED | OUTPATIENT
Start: 2022-09-13 | End: 2022-09-14 | Stop reason: HOSPADM

## 2022-09-13 RX ORDER — FENTANYL CITRATE 50 UG/ML
25 INJECTION, SOLUTION INTRAMUSCULAR; INTRAVENOUS
Status: DISCONTINUED | OUTPATIENT
Start: 2022-09-13 | End: 2022-09-13 | Stop reason: HOSPADM

## 2022-09-13 RX ORDER — SODIUM CHLORIDE, SODIUM LACTATE, POTASSIUM CHLORIDE, CALCIUM CHLORIDE 600; 310; 30; 20 MG/100ML; MG/100ML; MG/100ML; MG/100ML
125 INJECTION, SOLUTION INTRAVENOUS CONTINUOUS
Status: DISCONTINUED | OUTPATIENT
Start: 2022-09-13 | End: 2022-09-14

## 2022-09-13 RX ORDER — ONDANSETRON 2 MG/ML
4 INJECTION INTRAMUSCULAR; INTRAVENOUS
Status: DISCONTINUED | OUTPATIENT
Start: 2022-09-13 | End: 2022-09-14 | Stop reason: HOSPADM

## 2022-09-13 RX ORDER — SODIUM CHLORIDE 0.9 % (FLUSH) 0.9 %
5-40 SYRINGE (ML) INJECTION EVERY 8 HOURS
Status: DISCONTINUED | OUTPATIENT
Start: 2022-09-13 | End: 2022-09-14 | Stop reason: HOSPADM

## 2022-09-13 RX ORDER — MIDAZOLAM HYDROCHLORIDE 1 MG/ML
INJECTION, SOLUTION INTRAMUSCULAR; INTRAVENOUS AS NEEDED
Status: DISCONTINUED | OUTPATIENT
Start: 2022-09-13 | End: 2022-09-13 | Stop reason: HOSPADM

## 2022-09-13 RX ORDER — PANTOPRAZOLE SODIUM 40 MG/1
40 TABLET, DELAYED RELEASE ORAL
Status: DISCONTINUED | OUTPATIENT
Start: 2022-09-14 | End: 2022-09-14 | Stop reason: HOSPADM

## 2022-09-13 RX ORDER — PROMETHAZINE HYDROCHLORIDE 25 MG/1
12.5 TABLET ORAL
Status: DISCONTINUED | OUTPATIENT
Start: 2022-09-13 | End: 2022-09-14 | Stop reason: HOSPADM

## 2022-09-13 RX ORDER — NALOXONE HYDROCHLORIDE 0.4 MG/ML
0.04 INJECTION, SOLUTION INTRAMUSCULAR; INTRAVENOUS; SUBCUTANEOUS
Status: DISCONTINUED | OUTPATIENT
Start: 2022-09-13 | End: 2022-09-13 | Stop reason: HOSPADM

## 2022-09-13 RX ORDER — ACETAMINOPHEN 500 MG
1000 TABLET ORAL EVERY 6 HOURS
Status: DISCONTINUED | OUTPATIENT
Start: 2022-09-13 | End: 2022-09-14 | Stop reason: HOSPADM

## 2022-09-13 RX ORDER — LIDOCAINE HYDROCHLORIDE 10 MG/ML
0.1 INJECTION, SOLUTION EPIDURAL; INFILTRATION; INTRACAUDAL; PERINEURAL AS NEEDED
Status: DISCONTINUED | OUTPATIENT
Start: 2022-09-13 | End: 2022-09-13 | Stop reason: HOSPADM

## 2022-09-13 RX ORDER — LABETALOL HCL 20 MG/4 ML
10 SYRINGE (ML) INTRAVENOUS ONCE
Status: COMPLETED | OUTPATIENT
Start: 2022-09-13 | End: 2022-09-13

## 2022-09-13 RX ORDER — FAMOTIDINE 10 MG/ML
INJECTION INTRAVENOUS AS NEEDED
Status: DISCONTINUED | OUTPATIENT
Start: 2022-09-13 | End: 2022-09-13 | Stop reason: HOSPADM

## 2022-09-13 RX ORDER — OXYCODONE HYDROCHLORIDE 5 MG/1
5 TABLET ORAL
Status: DISCONTINUED | OUTPATIENT
Start: 2022-09-13 | End: 2022-09-14 | Stop reason: HOSPADM

## 2022-09-13 RX ORDER — KETAMINE HYDROCHLORIDE 10 MG/ML
INJECTION, SOLUTION INTRAMUSCULAR; INTRAVENOUS AS NEEDED
Status: DISCONTINUED | OUTPATIENT
Start: 2022-09-13 | End: 2022-09-13 | Stop reason: HOSPADM

## 2022-09-13 RX ORDER — SODIUM CHLORIDE 0.9 % (FLUSH) 0.9 %
5-40 SYRINGE (ML) INJECTION AS NEEDED
Status: DISCONTINUED | OUTPATIENT
Start: 2022-09-13 | End: 2022-09-14 | Stop reason: HOSPADM

## 2022-09-13 RX ORDER — AMOXICILLIN 250 MG
1 CAPSULE ORAL 2 TIMES DAILY
Status: DISCONTINUED | OUTPATIENT
Start: 2022-09-13 | End: 2022-09-14 | Stop reason: HOSPADM

## 2022-09-13 RX ORDER — SODIUM CHLORIDE 0.9 % (FLUSH) 0.9 %
5-40 SYRINGE (ML) INJECTION EVERY 8 HOURS
Status: DISCONTINUED | OUTPATIENT
Start: 2022-09-13 | End: 2022-09-13 | Stop reason: HOSPADM

## 2022-09-13 RX ORDER — ALBUTEROL SULFATE 0.83 MG/ML
2.5 SOLUTION RESPIRATORY (INHALATION) AS NEEDED
Status: DISCONTINUED | OUTPATIENT
Start: 2022-09-13 | End: 2022-09-13 | Stop reason: HOSPADM

## 2022-09-13 RX ORDER — FLUMAZENIL 0.1 MG/ML
0.2 INJECTION INTRAVENOUS
Status: DISCONTINUED | OUTPATIENT
Start: 2022-09-13 | End: 2022-09-13 | Stop reason: HOSPADM

## 2022-09-13 RX ORDER — HYDROMORPHONE HYDROCHLORIDE 1 MG/ML
.25-1 INJECTION, SOLUTION INTRAMUSCULAR; INTRAVENOUS; SUBCUTANEOUS
Status: DISCONTINUED | OUTPATIENT
Start: 2022-09-13 | End: 2022-09-13 | Stop reason: HOSPADM

## 2022-09-13 RX ORDER — SODIUM CHLORIDE, SODIUM LACTATE, POTASSIUM CHLORIDE, CALCIUM CHLORIDE 600; 310; 30; 20 MG/100ML; MG/100ML; MG/100ML; MG/100ML
INJECTION, SOLUTION INTRAVENOUS
Status: DISCONTINUED | OUTPATIENT
Start: 2022-09-13 | End: 2022-09-13 | Stop reason: HOSPADM

## 2022-09-13 RX ORDER — ONDANSETRON 2 MG/ML
4 INJECTION INTRAMUSCULAR; INTRAVENOUS AS NEEDED
Status: DISCONTINUED | OUTPATIENT
Start: 2022-09-13 | End: 2022-09-13 | Stop reason: HOSPADM

## 2022-09-13 RX ORDER — FENTANYL CITRATE 50 UG/ML
INJECTION, SOLUTION INTRAMUSCULAR; INTRAVENOUS AS NEEDED
Status: DISCONTINUED | OUTPATIENT
Start: 2022-09-13 | End: 2022-09-13 | Stop reason: HOSPADM

## 2022-09-13 RX ORDER — SUCCINYLCHOLINE CHLORIDE 20 MG/ML
INJECTION INTRAMUSCULAR; INTRAVENOUS AS NEEDED
Status: DISCONTINUED | OUTPATIENT
Start: 2022-09-13 | End: 2022-09-13 | Stop reason: HOSPADM

## 2022-09-13 RX ORDER — DIPHENHYDRAMINE HYDROCHLORIDE 50 MG/ML
12.5 INJECTION, SOLUTION INTRAMUSCULAR; INTRAVENOUS AS NEEDED
Status: DISCONTINUED | OUTPATIENT
Start: 2022-09-13 | End: 2022-09-13 | Stop reason: HOSPADM

## 2022-09-13 RX ORDER — ONDANSETRON 2 MG/ML
INJECTION INTRAMUSCULAR; INTRAVENOUS AS NEEDED
Status: DISCONTINUED | OUTPATIENT
Start: 2022-09-13 | End: 2022-09-13 | Stop reason: HOSPADM

## 2022-09-13 RX ORDER — ROCURONIUM BROMIDE 10 MG/ML
INJECTION, SOLUTION INTRAVENOUS AS NEEDED
Status: DISCONTINUED | OUTPATIENT
Start: 2022-09-13 | End: 2022-09-13 | Stop reason: HOSPADM

## 2022-09-13 RX ORDER — HYDROMORPHONE HYDROCHLORIDE 2 MG/ML
INJECTION, SOLUTION INTRAMUSCULAR; INTRAVENOUS; SUBCUTANEOUS AS NEEDED
Status: DISCONTINUED | OUTPATIENT
Start: 2022-09-13 | End: 2022-09-13 | Stop reason: HOSPADM

## 2022-09-13 RX ORDER — LIDOCAINE HYDROCHLORIDE 20 MG/ML
INJECTION, SOLUTION EPIDURAL; INFILTRATION; INTRACAUDAL; PERINEURAL AS NEEDED
Status: DISCONTINUED | OUTPATIENT
Start: 2022-09-13 | End: 2022-09-13 | Stop reason: HOSPADM

## 2022-09-13 RX ORDER — NEOSTIGMINE METHYLSULFATE 1 MG/ML
INJECTION, SOLUTION INTRAVENOUS AS NEEDED
Status: DISCONTINUED | OUTPATIENT
Start: 2022-09-13 | End: 2022-09-13 | Stop reason: HOSPADM

## 2022-09-13 RX ADMIN — CEFAZOLIN SODIUM 2 G: 1 POWDER, FOR SOLUTION INTRAMUSCULAR; INTRAVENOUS at 07:50

## 2022-09-13 RX ADMIN — SUCCINYLCHOLINE CHLORIDE 100 MG: 20 INJECTION, SOLUTION INTRAMUSCULAR; INTRAVENOUS at 07:45

## 2022-09-13 RX ADMIN — LIDOCAINE HYDROCHLORIDE 40 MG: 20 INJECTION, SOLUTION EPIDURAL; INFILTRATION; INTRACAUDAL; PERINEURAL at 07:44

## 2022-09-13 RX ADMIN — HYDROMORPHONE HYDROCHLORIDE 1 MG: 1 INJECTION, SOLUTION INTRAMUSCULAR; INTRAVENOUS; SUBCUTANEOUS at 12:14

## 2022-09-13 RX ADMIN — Medication 80 MCG: at 08:11

## 2022-09-13 RX ADMIN — PROPOFOL 150 MG: 10 INJECTION, EMULSION INTRAVENOUS at 07:44

## 2022-09-13 RX ADMIN — OXYCODONE 5 MG: 5 TABLET ORAL at 22:46

## 2022-09-13 RX ADMIN — SUGAMMADEX 100 MG: 100 INJECTION, SOLUTION INTRAVENOUS at 11:19

## 2022-09-13 RX ADMIN — DOCUSATE SODIUM 50MG AND SENNOSIDES 8.6MG 1 TABLET: 8.6; 5 TABLET, FILM COATED ORAL at 20:45

## 2022-09-13 RX ADMIN — SODIUM CHLORIDE, SODIUM LACTATE, POTASSIUM CHLORIDE, AND CALCIUM CHLORIDE: 600; 310; 30; 20 INJECTION, SOLUTION INTRAVENOUS at 07:27

## 2022-09-13 RX ADMIN — FAMOTIDINE 20 MG: 10 INJECTION INTRAVENOUS at 07:27

## 2022-09-13 RX ADMIN — OXYCODONE 5 MG: 5 TABLET ORAL at 17:34

## 2022-09-13 RX ADMIN — NEOSTIGMINE METHYLSULFATE 3 MG: 1 INJECTION, SOLUTION INTRAVENOUS at 10:57

## 2022-09-13 RX ADMIN — SODIUM CHLORIDE, POTASSIUM CHLORIDE, SODIUM LACTATE AND CALCIUM CHLORIDE 125 ML/HR: 600; 310; 30; 20 INJECTION, SOLUTION INTRAVENOUS at 22:42

## 2022-09-13 RX ADMIN — LABETALOL HYDROCHLORIDE 10 MG: 5 INJECTION, SOLUTION INTRAVENOUS at 13:58

## 2022-09-13 RX ADMIN — ROCURONIUM BROMIDE 10 MG: 10 INJECTION INTRAVENOUS at 07:44

## 2022-09-13 RX ADMIN — SODIUM CHLORIDE, POTASSIUM CHLORIDE, SODIUM LACTATE AND CALCIUM CHLORIDE 125 ML/HR: 600; 310; 30; 20 INJECTION, SOLUTION INTRAVENOUS at 07:09

## 2022-09-13 RX ADMIN — KETAMINE HYDROCHLORIDE 10 MG: 10 INJECTION INTRAMUSCULAR; INTRAVENOUS at 08:03

## 2022-09-13 RX ADMIN — HYDROMORPHONE HYDROCHLORIDE 0.5 MG: 2 INJECTION, SOLUTION INTRAMUSCULAR; INTRAVENOUS; SUBCUTANEOUS at 07:57

## 2022-09-13 RX ADMIN — ROCURONIUM BROMIDE 20 MG: 10 INJECTION INTRAVENOUS at 09:45

## 2022-09-13 RX ADMIN — ACETAMINOPHEN 1000 MG: 500 TABLET ORAL at 20:45

## 2022-09-13 RX ADMIN — ROCURONIUM BROMIDE 30 MG: 10 INJECTION INTRAVENOUS at 07:50

## 2022-09-13 RX ADMIN — SODIUM CHLORIDE, PRESERVATIVE FREE 10 ML: 5 INJECTION INTRAVENOUS at 20:46

## 2022-09-13 RX ADMIN — FENTANYL CITRATE 100 MCG: 50 INJECTION, SOLUTION INTRAMUSCULAR; INTRAVENOUS at 07:39

## 2022-09-13 RX ADMIN — HYDROMORPHONE HYDROCHLORIDE 0.5 MG: 2 INJECTION, SOLUTION INTRAMUSCULAR; INTRAVENOUS; SUBCUTANEOUS at 11:00

## 2022-09-13 RX ADMIN — DEXAMETHASONE SODIUM PHOSPHATE 8 MG: 4 INJECTION, SOLUTION INTRAMUSCULAR; INTRAVENOUS at 07:51

## 2022-09-13 RX ADMIN — ROCURONIUM BROMIDE 10 MG: 10 INJECTION INTRAVENOUS at 08:47

## 2022-09-13 RX ADMIN — GLYCOPYRROLATE 0.4 MG: 0.2 INJECTION INTRAMUSCULAR; INTRAVENOUS at 10:57

## 2022-09-13 RX ADMIN — KETAMINE HYDROCHLORIDE 20 MG: 10 INJECTION INTRAMUSCULAR; INTRAVENOUS at 07:53

## 2022-09-13 RX ADMIN — SODIUM CHLORIDE, POTASSIUM CHLORIDE, SODIUM LACTATE AND CALCIUM CHLORIDE: 600; 310; 30; 20 INJECTION, SOLUTION INTRAVENOUS at 11:00

## 2022-09-13 RX ADMIN — HYDROMORPHONE HYDROCHLORIDE 1 MG: 1 INJECTION, SOLUTION INTRAMUSCULAR; INTRAVENOUS; SUBCUTANEOUS at 11:45

## 2022-09-13 RX ADMIN — ONDANSETRON HYDROCHLORIDE 4 MG: 2 SOLUTION INTRAMUSCULAR; INTRAVENOUS at 10:51

## 2022-09-13 RX ADMIN — MIDAZOLAM HYDROCHLORIDE 2 MG: 1 INJECTION, SOLUTION INTRAMUSCULAR; INTRAVENOUS at 07:27

## 2022-09-13 RX ADMIN — KETAMINE HYDROCHLORIDE 10 MG: 10 INJECTION INTRAMUSCULAR; INTRAVENOUS at 08:33

## 2022-09-13 RX ADMIN — KETAMINE HYDROCHLORIDE 10 MG: 10 INJECTION INTRAMUSCULAR; INTRAVENOUS at 08:20

## 2022-09-13 RX ADMIN — ROCURONIUM BROMIDE 30 MG: 10 INJECTION INTRAVENOUS at 08:03

## 2022-09-13 NOTE — BRIEF OP NOTE
Brief Postoperative Note    Patient: Lise March  YOB: 1955  MRN: [id number]    Date of Procedure: 9/13/2022     Pre-Op Diagnosis: PROSTATE CANCER    Post-Op Diagnosis: Same as preoperative diagnosis.       Procedure(s):  ROBOTIC ASSISTED RADICAL PROSTATECTOMY    Surgeon(s):  Jyothi Perez MD    Surgical Assistant: Surg Asst-1: Jacques Arredondo    Anesthesia: General     Estimated Blood Loss (mL): 65BM    Complications: None    Specimens:   ID Type Source Tests Collected by Time Destination   1 : Prostate and seminal vesicles Preservative Prostate  Jyothi Perez MD 9/13/2022 1044 Pathology        Implants: * No implants in log *    Drains:   Donnita Maizes #1 09/13/22 Right Abdomen (Active)       Findings: normal intraabdominal anatomy     Electronically Signed by Vinnie Frances MD on 9/13/2022 at 10:53 AM

## 2022-09-13 NOTE — PERIOP NOTES
Dr Harvey Arias called back and stated to watch BP and if SBP remained below 110 to monitor, if above 110 call back, and to keep monitoring urinary output

## 2022-09-13 NOTE — OP NOTES
Operative Note    Patient: German Pagan  YOB: 1955  MRN: [id number]    Date of Procedure: 9/13/2022     Pre-Op Diagnosis: PROSTATE CANCER    Post-Op Diagnosis: Same as preoperative diagnosis. Procedure(s):  ROBOTIC ASSISTED RADICAL PROSTATECTOMY    Surgeon(s):  Raimundo Mena MD    Surgical Assistant: Surg Asst-1: Jacques Arredondo    Anesthesia: General     Estimated Blood Loss (mL):  76SA    Complications: None    Specimens:   ID Type Source Tests Collected by Time Destination   1 : Prostate and seminal vesicles Preservative Prostate  Raimundo Mena MD 9/13/2022 1044 Pathology        Implants: none    Drains:   Mecca Showers #1 09/13/22 Right Abdomen (Active)       Findings: normal intraabdominal anatomy    Detailed Description of Procedure:     After informed consent was obtained, the patient was taken to the operating room, placed on the table in the supine position. Sequential compression devices were administered to the lower extremities and then the patient was administered general anesthetic. He was then was positioned in the supine position in the steep Trendelenburg for robotic-assisted pelvic surgery. The area was then prepped and draped in the usual sterile fashion for a robotic pelvic surgery. We placed a Pak catheter to empty the urinary bladder. After a time-out was performed and pre-operative antibiotic was administered, access to the intraabdominal cavity was gained placing a Veress needle in the supraumbilical position. Opening pressures were low and then the abdomen was insufflated to 15 mmHg. We then marked out our ports and then placed our camera port in supraumbilical position. All remaining ports were placed under direct vision and the robot was then docked. After the robot was docked, we placed instruments with a pair of scissors in the right hand, [place] bipolar in the left hand, ProGrasp in the fourth arm.   A 0-degree lens was utilized and then I went and sat down at the surgeon's console. Initial dissection began with posterior dissection identifying the bilateral vas deferens and seminal vesicles. These were completely dissected. We then dissected in the posterior plane up to the level of the apex of the prostate superior to Denonvillier's fascia. After this was performed, we proceeded to drop the bladder first starting on the right side and then proceeding over to the left. Once the bladder was completely dropped, we prepared the anterior surface of the prostate. The superficial dorsal vein was then prepared and transected with the use of [place] bipolar. The anterior fat on the anterior surface of the prostate was completely dissected off back to the level of the bladder neck and then this was removed. We identified the junction between the bladder neck as well as the base of the prostate by pulling on the Pak catheter and then an incision was made there after changing the camera to the 30-degree lens. We were able to dissect down to the level of the Pak catheter at which time we deflated the balloon and then back the catheter up into the prostate. We proceeded to dissect down to enter into the posterior space which had been previously dissected and identified the bilateral seminal vesicles as well as vas deferens. We then switched to a Harmonic in my right hand and then took down the pedicles of the prostate on the right and left side. At that time, we then grabbed the seminal vesicles and utilized the fourth arm to retract the seminal vesicles to the left to allow for our antegrade dissection of the prostate up to the level of the prostate apex. This procedure was then repeated on the left side up to the level of the prostate apex. Once the prostate had been completely dissected and the dorsal venous complex prepared, we utilized one application of the stapler to transect the dorsal venous complex.   We then transected this and allowed for dissection down to the Pak catheter at the level of the urethra. The catheter was removed and then we placed a 3-0 Vicryl RB-1 8 inches at the 6 o'clock position on the urethra. The posterior aspect of the urethra was transected and the prostate was completely freed. The prostate was then placed into an EndoCatch retriever bag out of harm's way. The prostate bed was then inspected for hemostasis and hemostasis was seen. We utilized one application of FloSeal and it was tamponaded with a damped laparotomy sponge for several minutes. This damped laparotomy sponge was then removed. Hemostasis was seen. We then proceeded to perform our anastomosis utilizing the 3-0 Vicryl to place in the corresponding position at the 6 o'clock aspect of the bladder neck. The bladder was then reapproximated to the posterior aspect of the urethra. Two additional sutures were used at the 5 and 7 o'clock positions re approximating the urethra to the bladder neck. We then utilized our V-Loc suture that had been tied together in the middle to round up the left side and then the right side of the bladder neck tying together in the middle. This was performed over a Pak catheter. Pak catheter was utilized to distend the bladder at the end. There was no evidence of extravasation. This Pak catheter was then removed and replaced with a fresh Pak catheter. 10 mL were placed into the balloon. The balloon was brought down to the level of the bladder neck. A drain was utilized in the right arm. Secured to the skin. We then proceeded to remove the robotic instruments, undocked the robot and widened the fascia at the supraumbilical incision. The prostate was then removed and the fascia was then closed using 0 Vicryl sutures in a figure-of-eight fashion. Each port site as well as skin incision was closed using a running subcuticular stitch. Local anesthetic was evenly distributed throughout each incision. Skin glue was applied. The patient was then awakened, extubated, transferred to the PACU in stable condition. Needle and sponge count was correct x2. There were no complications.       Electronically Signed by Luis Barakat MD on 9/13/2022 at 10:54 AM

## 2022-09-13 NOTE — PROGRESS NOTES
Bedside shift change report given to Lelo (oncoming nurse) by Ignacia Alejandro (offgoing nurse). Report included the following information SBAR, Kardex, and OR Summary.

## 2022-09-13 NOTE — ANESTHESIA POSTPROCEDURE EVALUATION
Procedure(s):  ROBOTIC ASSISTED RADICAL PROSTATECTOMY. general    Anesthesia Post Evaluation        Patient location during evaluation: PACU  Patient participation: complete - patient participated  Level of consciousness: sleepy but conscious  Pain management: adequate  Airway patency: patent  Anesthetic complications: no  Cardiovascular status: acceptable, stable and hypertensive  Respiratory status: acceptable and unassisted  Hydration status: acceptable  Comments: The patient was seen and evaluated in the post-operative period. The time of my evaluation may not match the time of this note. The patient denied uncontrolled pain or nausea, and there were no significant complications evident. Miguel Angel Wills MD      Post anesthesia nausea and vomiting:  none  Final Post Anesthesia Temperature Assessment:  Normothermia (36.0-37.5 degrees C)      INITIAL Post-op Vital signs:   Vitals Value Taken Time   /106 09/13/22 1215   Temp 36.9 °C (98.4 °F) 09/13/22 1137   Pulse 85 09/13/22 1218   Resp 6 09/13/22 1218   SpO2 94 % 09/13/22 1218   Vitals shown include unvalidated device data.

## 2022-09-13 NOTE — DISCHARGE INSTRUCTIONS
Dr. Marina Kim. Barney Brandt or his associate will see you back in the office in 1 week. At that time your final pathology report will be reviewed with you. Your Pak catheter will be evaluated for removal at that time. You will be re-educated on male kegel exercises to strengthen your pelvic muscles. This exercise is felt to hasten your recovery of urinary continence. Virtually everyone has leakage of urine upon removal of the catheter and recovery time is variable and everyone is different. Please be patient. Please bring an adult urinary pad (such as Depend Guards) with you on this appointment. DISCHARGE MEDICATIONS    Upon discharge you will be given prescriptions for pain control (Lortab) and antibiotic coverage (CIPRO). The antibiotic should be started the evening prior to seeing Dr. Steve Brandt in clinic. You should take your first dose the evening of 9/19/22. Your second dose should be taken the morning of 9/20/22, prior to seeing Dr. Steve Brandt. The third dose should be taken the evening of 9/20/22, after your office visit. Most patients experience only minimal discomfort after this minimally invasive surgery. We recommend using Tylenol (acetaminophen) for pain first and reserve the narcotic pain medication as an alternative as it tends to cause constipation. You may resume your normal medications upon discharge from the hospital with the exception of any blood thinning products (such as coumadin or aspirin). ACTIVITY    Please refrain from driving for the 1st week after your surgery. You may shower upon discharge from the hospital. Avoid bathtubs, hot tubs or swimming pools during 1st 2 weeks. It is important to be mobile during your recovery period. Avoid sitting in one position for longer than 45 minutes to 1 hour. Walking and climbing stairs are OK. Be careful not to overdo it.   Avoid any heavy lifting or strenuous activity for the first 2 weeks. Most patients ease into normal activities (including employment) after 2 weeks of recuperation. Most patients resume driving by the 2nd week. Please use your best judgment. CATHETER CARE    Keep your [facility] urinary catheter below the level of your bladder at all times otherwise it may not drain properly. The leg bag can be fitted under your trousers for daytime use. The larger overnight bag will hold more urine and is best reserved for bedtime use. Minimal care of this unit is required. Make sure there is slack on the catheter between your penis and the drainage bag. This should not be on any tension. Empty the bag when full. You may disconnect the urinary drainage bag when showering and let the catheter drain freely. A topical antibiotic ointment applied to the catheter as inserts into the penis will reduce discomfort from the catheter. This can be obtained over the counter at your local pharmacy. Do not perform the male kegel exercises while the urinary catheter is in place. CARE OF YOUR INCISION SITES    Application of ointments or creams to the incision sites is not recommended. The adhesive clear wound dressing will slough off naturally in 5 - 10 days  Do not pick at or apply ointments/medications to the adhesive dressing  Do not scrub, soak or expose incisions to prolonged moisture. MALE KEGEL EXERCISES    Once your [facility] urinary catheter is removed it will be safe to start these exercises. Your surgery removed your prostate and affected your secondary urinary control mechanisms. Your external sphincter must now take all the responsibility for keeping you dry and continent. It will take time and effort to strengthen this mechanism. How do you do Kegel exercises? The first step is to find the right muscles. Imagine that you are trying to stop yourself from passing gas. Squeeze the muscles you would use.  If you sense a \"pulling\" feeling, those are the right muscles for pelvic exercises. It is important not to squeeze other muscles at the same time and not to hold your breath. Also, be careful not to tighten your stomach, leg, or buttock muscles. Squeezing the wrong muscles can put more pressure on your bladder control muscles. Squeeze just the pelvic muscles. Repeat, but do not overdo it. Pull in the pelvic muscles and hold for a count of 3. Then relax for a count of 3. Work up to [employer] of 10 repeats. Be patient. Do not give up. It takes just 5 minutes, three to five times a day. Your bladder control may not improve for 3 to 6 weeks, although most people notice an improvement after a few weeks. DIET     Please avoid carbonated beverages and any other gas producing foods (such as flour, beans, or broccoli) for the 1st week or so. Small meals are best until bowel habits return to normal.    THINGS YOU MAY ENCOUNTER AFTER SURGERY    Bruising around incision sites. Not at all uncommon and should not alarm you. This will resolve with time. Abdominal distention, constipation or bloating. Make sure you are following the dietary instructions. If you dont have a bowel movement or pass gas or are feeling uncomfortable 24 hours after surgery, try taking Milk of Magnesia as directed on the bottle. If after two doses of Milk of Magnesia, you still have not had a bowel movement, it is safe to use a Dulcolax suppository (available over the counter at your local pharmacy). Scrotal/Penile swelling and bruising. This is quite common and should not alarm you. It may appear immediately after surgery or may start 4 -5 days after surgery. It should resolve in about 7 - 14 days. You may try elevating your scrotum with a small towel or washcloth when lying down. Bloody drainage around baker catheter or in the urine. This is especially common after increased activity or following a bowel movement. Do not be alarmed. Resting for a short period and drinking plenty of fluids usually improves the situation. Leaking urine around Pak catheter. This is not unusual.  The catheter is not perfect, but most of the urine should flow through the catheter into the drainage bag. Bladder spasms. It is not uncommon with the catheter in and even after the catheter comes out to have bladder spasms. You may feel mild to severe bladder pain or cramping. You may also experience the sudden urge to urinate or a burning sensation when you urinate. Call your MD if this persists without relief. Perineal pain (pain between your rectum and scrotum)/Testicular discomfort. This may last for several weeks after surgery, but it will resolve. Call your MD if the pain medication does not alleviate this. Lower legs/ankle swelling. This is not abnormal with it occurs in both legs and should not alarm you. It should resolve in about 7 - 14 days. Elevation of your legs while sitting will help. CONTACT MD IMMEDIATELY IF YOU ARE EXPERIENCING ANY OF THE FOLLOWING SYMPTOMS:    Oral Temperature over 101° F. Urine stops draining from Pak into drainage bag. Any pain not relieved by pain medication prescribed. Nausea/Vomiting. Large amount of blood clots in urine that are blocking the catheter from draining. Bladder spasms that are not relieved with pain medication. Uneven swelling of legs or ankles. Redness and/or large amount of swelling around your incision sites. Excessive bleeding or drainage from your incision sites.         [address]  [phone]

## 2022-09-13 NOTE — ROUTINE PROCESS
TRANSFER - OUT REPORT:    Verbal report given to BALDEMAR Graff(name) on Sherri Rollins  being transferred to 5th floor(unit) for routine post - op       Report consisted of patients Situation, Background, Assessment and   Recommendations(SBAR). Information from the following report(s) SBAR, OR Summary, Intake/Output, and MAR was reviewed with the receiving nurse. Lines:   Peripheral IV 09/13/22 Anterior;Right Forearm (Active)   Site Assessment Clean, dry, & intact 09/13/22 1335   Phlebitis Assessment 0 09/13/22 1335   Infiltration Assessment 0 09/13/22 1335   Dressing Status Clean, dry, & intact 09/13/22 1335   Dressing Type Tape;Transparent 09/13/22 1335   Hub Color/Line Status Pink; Infusing 09/13/22 1335   Action Taken Open ports on tubing capped 09/13/22 0652   Alcohol Cap Used Yes 09/13/22 1335       Peripheral IV 09/13/22 Anterior; Left Forearm (Active)   Site Assessment Clean, dry, & intact 09/13/22 1335   Phlebitis Assessment 0 09/13/22 1335   Infiltration Assessment 0 09/13/22 1335   Dressing Status Clean, dry, & intact 09/13/22 1335   Dressing Type Tape;Transparent 09/13/22 1335   Hub Color/Line Status Pink; Infusing 09/13/22 1335   Alcohol Cap Used Yes 09/13/22 1335        Opportunity for questions and clarification was provided.       Patient transported with:   Registered Nurse

## 2022-09-13 NOTE — ANESTHESIA PREPROCEDURE EVALUATION
Anesthetic History   No history of anesthetic complications            Review of Systems / Medical History  Patient summary reviewed, nursing notes reviewed and pertinent labs reviewed    Pulmonary            Asthma : well controlled       Neuro/Psych   Within defined limits          Comments: Chronic back pain Cardiovascular              Hyperlipidemia    Exercise tolerance: >4 METS     GI/Hepatic/Renal     GERD: well controlled           Endo/Other        Arthritis and cancer (prostate cancer)     Other Findings              Physical Exam    Airway  Mallampati: IV  TM Distance: 4 - 6 cm  Neck ROM: normal range of motion   Mouth opening: Normal     Cardiovascular    Rhythm: regular  Rate: normal         Dental    Dentition: Upper partial plate     Pulmonary  Breath sounds clear to auscultation               Abdominal         Other Findings            Anesthetic Plan    ASA: 2  Anesthesia type: general          Induction: Intravenous  Anesthetic plan and risks discussed with: Patient

## 2022-09-14 VITALS
WEIGHT: 181.66 LBS | OXYGEN SATURATION: 97 % | TEMPERATURE: 98.3 F | HEIGHT: 67 IN | SYSTOLIC BLOOD PRESSURE: 136 MMHG | DIASTOLIC BLOOD PRESSURE: 81 MMHG | BODY MASS INDEX: 28.51 KG/M2 | RESPIRATION RATE: 18 BRPM | HEART RATE: 75 BPM

## 2022-09-14 LAB
ALBUMIN SERPL-MCNC: 2.9 G/DL (ref 3.5–5)
ALBUMIN/GLOB SERPL: 0.8 {RATIO} (ref 1.1–2.2)
ALP SERPL-CCNC: 46 U/L (ref 45–117)
ALT SERPL-CCNC: 24 U/L (ref 12–78)
ANION GAP SERPL CALC-SCNC: 5 MMOL/L (ref 5–15)
AST SERPL-CCNC: 20 U/L (ref 15–37)
BILIRUB SERPL-MCNC: 0.7 MG/DL (ref 0.2–1)
BUN SERPL-MCNC: 10 MG/DL (ref 6–20)
BUN/CREAT SERPL: 11 (ref 12–20)
CALCIUM SERPL-MCNC: 8.8 MG/DL (ref 8.5–10.1)
CHLORIDE SERPL-SCNC: 106 MMOL/L (ref 97–108)
CO2 SERPL-SCNC: 28 MMOL/L (ref 21–32)
CREAT SERPL-MCNC: 0.93 MG/DL (ref 0.7–1.3)
ERYTHROCYTE [DISTWIDTH] IN BLOOD BY AUTOMATED COUNT: 13.6 % (ref 11.5–14.5)
GLOBULIN SER CALC-MCNC: 3.5 G/DL (ref 2–4)
GLUCOSE SERPL-MCNC: 129 MG/DL (ref 65–100)
HCT VFR BLD AUTO: 35.4 % (ref 36.6–50.3)
HGB BLD-MCNC: 11.9 G/DL (ref 12.1–17)
MCH RBC QN AUTO: 30.7 PG (ref 26–34)
MCHC RBC AUTO-ENTMCNC: 33.6 G/DL (ref 30–36.5)
MCV RBC AUTO: 91.5 FL (ref 80–99)
NRBC # BLD: 0 K/UL (ref 0–0.01)
NRBC BLD-RTO: 0 PER 100 WBC
PLATELET # BLD AUTO: 252 K/UL (ref 150–400)
PMV BLD AUTO: 9.3 FL (ref 8.9–12.9)
POTASSIUM SERPL-SCNC: 3.8 MMOL/L (ref 3.5–5.1)
PROT SERPL-MCNC: 6.4 G/DL (ref 6.4–8.2)
RBC # BLD AUTO: 3.87 M/UL (ref 4.1–5.7)
SODIUM SERPL-SCNC: 139 MMOL/L (ref 136–145)
WBC # BLD AUTO: 10.5 K/UL (ref 4.1–11.1)

## 2022-09-14 PROCEDURE — 36415 COLL VENOUS BLD VENIPUNCTURE: CPT

## 2022-09-14 PROCEDURE — 80053 COMPREHEN METABOLIC PANEL: CPT

## 2022-09-14 PROCEDURE — G0378 HOSPITAL OBSERVATION PER HR: HCPCS

## 2022-09-14 PROCEDURE — 85027 COMPLETE CBC AUTOMATED: CPT

## 2022-09-14 PROCEDURE — 74011250636 HC RX REV CODE- 250/636: Performed by: UROLOGY

## 2022-09-14 PROCEDURE — 74011250637 HC RX REV CODE- 250/637: Performed by: UROLOGY

## 2022-09-14 PROCEDURE — 94761 N-INVAS EAR/PLS OXIMETRY MLT: CPT

## 2022-09-14 PROCEDURE — 74011000250 HC RX REV CODE- 250: Performed by: UROLOGY

## 2022-09-14 RX ADMIN — SODIUM CHLORIDE, PRESERVATIVE FREE 10 ML: 5 INJECTION INTRAVENOUS at 04:17

## 2022-09-14 RX ADMIN — SODIUM CHLORIDE, POTASSIUM CHLORIDE, SODIUM LACTATE AND CALCIUM CHLORIDE 125 ML/HR: 600; 310; 30; 20 INJECTION, SOLUTION INTRAVENOUS at 06:11

## 2022-09-14 RX ADMIN — ATORVASTATIN CALCIUM 10 MG: 10 TABLET, FILM COATED ORAL at 08:53

## 2022-09-14 RX ADMIN — ACETAMINOPHEN 1000 MG: 500 TABLET ORAL at 08:54

## 2022-09-14 RX ADMIN — MORPHINE SULFATE 2 MG: 2 INJECTION, SOLUTION INTRAMUSCULAR; INTRAVENOUS at 04:16

## 2022-09-14 RX ADMIN — DOCUSATE SODIUM 50MG AND SENNOSIDES 8.6MG 1 TABLET: 8.6; 5 TABLET, FILM COATED ORAL at 08:53

## 2022-09-14 RX ADMIN — ACETAMINOPHEN 1000 MG: 500 TABLET ORAL at 13:21

## 2022-09-14 RX ADMIN — OXYCODONE 5 MG: 5 TABLET ORAL at 08:53

## 2022-09-14 RX ADMIN — ACETAMINOPHEN 1000 MG: 500 TABLET ORAL at 02:52

## 2022-09-14 RX ADMIN — OXYCODONE 5 MG: 5 TABLET ORAL at 02:53

## 2022-09-14 RX ADMIN — PANTOPRAZOLE SODIUM 40 MG: 40 TABLET, DELAYED RELEASE ORAL at 06:19

## 2022-09-14 RX ADMIN — MORPHINE SULFATE 2 MG: 2 INJECTION, SOLUTION INTRAMUSCULAR; INTRAVENOUS at 11:16

## 2022-09-14 NOTE — PROGRESS NOTES
Urology Progress Note    Subjective:     Daily Progress Note: 2022 7:18 AM    Stefanie Carranza is 1 Day Post-Op ROBOTIC ASSISTED RADICAL PROSTATECTOMY and doing satisfactory. He reports pain is well controlled. He has no new complaints. He is tolerating clear liquids and  has not gotten OOB since surgery . Indwelling catheter is draining well. Objective:     Visit Vitals  /73   Pulse 76   Temp 97.3 °F (36.3 °C)   Resp 16   Ht 5' 7\" (1.702 m)   Wt 82.4 kg (181 lb 10.5 oz)   SpO2 94%   BMI 28.45 kg/m²        Temp (24hrs), Av.1 °F (36.7 °C), Min:97.3 °F (36.3 °C), Max:99 °F (37.2 °C)      Intake and Output:   1901 -  0700  In: 2000 [P.O.:480; I.V.:1520]  Out: 2780 [Urine:2420; Drains:60]  No intake/output data recorded. Physical Exam:   General appearance: alert, cooperative, no distress, appears stated age  Heart: no distress   Lungs: no distress   Abdomen: soft, non-tender. Extremities: no calf pain     Incision: clean, dry, and no drainage    Data Review:    Recent Results (from the past 24 hour(s))   CBC W/O DIFF    Collection Time: 22  8:00 AM   Result Value Ref Range    WBC 4.2 4.1 - 11.1 K/uL    RBC 4.10 4. 10 - 5.70 M/uL    HGB 13.0 12.1 - 17.0 g/dL    HCT 38.6 36.6 - 50.3 %    MCV 94.1 80.0 - 99.0 FL    MCH 31.7 26.0 - 34.0 PG    MCHC 33.7 30.0 - 36.5 g/dL    RDW 13.5 11.5 - 14.5 %    PLATELET 362 687 - 715 K/uL    MPV 9.3 8.9 - 12.9 FL    NRBC 0.0 0  WBC    ABSOLUTE NRBC 0.00 0.00 - 4.82 K/uL   METABOLIC PANEL, BASIC    Collection Time: 22  8:00 AM   Result Value Ref Range    Sodium 139 136 - 145 mmol/L    Potassium 4.0 3.5 - 5.1 mmol/L    Chloride 107 97 - 108 mmol/L    CO2 27 21 - 32 mmol/L    Anion gap 5 5 - 15 mmol/L    Glucose 110 (H) 65 - 100 mg/dL    BUN 12 6 - 20 MG/DL    Creatinine 0.79 0.70 - 1.30 MG/DL    BUN/Creatinine ratio 15 12 - 20      GFR est AA >60 >60 ml/min/1.73m2    GFR est non-AA >60 >60 ml/min/1.73m2    Calcium 8.4 (L) 8.5 - 10.1 MG/DL METABOLIC PANEL, COMPREHENSIVE    Collection Time: 09/14/22  3:26 AM   Result Value Ref Range    Sodium 139 136 - 145 mmol/L    Potassium 3.8 3.5 - 5.1 mmol/L    Chloride 106 97 - 108 mmol/L    CO2 28 21 - 32 mmol/L    Anion gap 5 5 - 15 mmol/L    Glucose 129 (H) 65 - 100 mg/dL    BUN 10 6 - 20 MG/DL    Creatinine 0.93 0.70 - 1.30 MG/DL    BUN/Creatinine ratio 11 (L) 12 - 20      GFR est AA >60 >60 ml/min/1.73m2    GFR est non-AA >60 >60 ml/min/1.73m2    Calcium 8.8 8.5 - 10.1 MG/DL    Bilirubin, total 0.7 0.2 - 1.0 MG/DL    ALT (SGPT) 24 12 - 78 U/L    AST (SGOT) 20 15 - 37 U/L    Alk.  phosphatase 46 45 - 117 U/L    Protein, total 6.4 6.4 - 8.2 g/dL    Albumin 2.9 (L) 3.5 - 5.0 g/dL    Globulin 3.5 2.0 - 4.0 g/dL    A-G Ratio 0.8 (L) 1.1 - 2.2     CBC W/O DIFF    Collection Time: 09/14/22  3:26 AM   Result Value Ref Range    WBC 10.5 4.1 - 11.1 K/uL    RBC 3.87 (L) 4.10 - 5.70 M/uL    HGB 11.9 (L) 12.1 - 17.0 g/dL    HCT 35.4 (L) 36.6 - 50.3 %    MCV 91.5 80.0 - 99.0 FL    MCH 30.7 26.0 - 34.0 PG    MCHC 33.6 30.0 - 36.5 g/dL    RDW 13.6 11.5 - 14.5 %    PLATELET 582 907 - 875 K/uL    MPV 9.3 8.9 - 12.9 FL    NRBC 0.0 0  WBC    ABSOLUTE NRBC 0.00 0.00 - 0.01 K/uL       Assessment/Plan:     Active Problems:    Prostate cancer ([facility] (9/13/2022)        Status Post:  Procedure(s):  ROBOTIC ASSISTED RADICAL PROSTATECTOMY     Plan:   provide and educate on IS use   Advance diet  OOB and ambulate in hallway this am   R Estella Palma [address] with baker x1 week, teach baker care  Plan for home later today when above goals have been met     Signed By: Nancy Bess NP                         September 14, 2022

## 2022-09-14 NOTE — DISCHARGE SUMMARY
Urology Discharge Summary    Patient: Martita Davis MRN: [id number]  SSN: [id number]    YOB: 1955  Age: 74 y.o. Sex: male             ADMISSION:  to Ruy Saxena MD on 9/13/2022  DATE OF DISCHARGE:  9/14/2022    ADMISSION DIAGNOSIS: Prostate cancer ([address] Sindy Mertwyla  DISCHARGE DIAGNOSIS: Same    PROCEDURES: Procedure(s):  ROBOTIC ASSISTED RADICAL PROSTATECTOMY    COMPLICATIONS: None identified. CONSULTS: None    Current Discharge Medication List        START taking these medications    Details   ciprofloxacin HCl (CIPRO) 500 mg tablet Take 1 Tablet by mouth every twelve (12) hours for 3 doses. Qty: 3 Tablet, Refills: 0  Start date: 9/13/2022, End date: 9/15/2022           CONTINUE these medications which have NOT CHANGED    Details   omeprazole (PRILOSEC) 20 mg capsule Take 20 mg by mouth daily. Indications: gastroesophageal reflux disease      cholecalciferol (VITAMIN D3) (2,000 UNITS /50 MCG) cap capsule Take 2,000 Units by mouth daily. oxycodone HCl (OXYCODONE PO) Take 1 Tablet by mouth as needed. simvastatin (ZOCOR) 20 mg tablet Take 20 mg by mouth nightly. folic acid (FOLVITE) 1 mg tablet Take 1 mg by mouth daily. STOP taking these medications       tamsulosin (FLOMAX) 0.4 mg capsule Comments:   Reason for Stopping:                  HOSPITAL COURSE: Yordan Carranza underwent Procedure(s):  ROBOTIC ASSISTED RADICAL PROSTATECTOMY on 2/60/9652 without complications. He had an uneventful recovery. His activity was increased, his diet was advanced and his pain control was transitioned to oral medications. He was discharged to home 1 Day Post-Op. CONDITION AT DISCHARGE:  Good. DISCHARGE TO: Home. FOLLOWUP:  1 week in the [facility].       Mathew Goode 34 9/14/2022 2:38 PM

## 2022-09-14 NOTE — PROGRESS NOTES
Hospital follow up appointments Dr Evelyn Mills prefers that patients and or family members call to schedule appointments.

## 2022-09-14 NOTE — PROGRESS NOTES
9/14/2022  2:36 PM  CM completed assessment w/ pt in person, CM wore mask at all times,  Charted demographics verified, pt lives w/ spouse in a private residence, at baseline pt is ambulatory, independent w/ his ADLs and drives. NO fall history  Reason for Admission:  Elective for Prostate CA   Pt is a 73 yo male who presents to [facility] for elective prostatectomy  PMHx:  Arthritis       Asthma due to seasonal allergies      AVN (avascular necrosis of bone) ([facility] 2014    COVID-19 08/06/2022    COVID-19 vaccine series completed      GERD (gastroesophageal reflux disease)      High cholesterol      Hypertension 2012    Pancreatitis      Prostate CA                         RUR Score:  N/A pt Is OBS, Low Risk  of Readmission/Green                Plan for utilizing home health:    NO history of HH or Rehab, pt is independent at baseline for ADLs    DME: None  Rx: pt uses [facility] and is nomally able to afford his medications  COVID Vax Hx: Lidia Ruelas, pt has 2 jabs and 1 booster in 2021   PCP: First and Last name:  Kendrick Kulkarni MD     Name of Practice:    Are you a current patient: Yes/No:    Approximate date of last visit:    Can you participate in a virtual visit with your PCP:                     Current Advanced Directive/Advance Care Plan: Full Code  HCDM/NOK spouse Mariann Kong [phone]:   Click here to complete [address] including selection of the Healthcare Decision Maker Relationship (ie \"Primary\")                             Transition of Care Plan:                    RUR N/A pt is OBS, Low Risk of Readmission/Green  LOS 1 Day  Hospital admission for surgical  management   POD # 1 ROBOTIC ASSISTED RADICAL PROSTATECTOMY  CM to follow through for treatment/response  DC when stable to home  Outpatient follow up urology  Spouse will transport  Care Management Interventions  PCP Verified by CM:  Yes Timoteo Felty, MD )  Palliative Care Criteria Met (RRAT>21 & CHF Dx)?: No  Mode of Transport at Discharge: Self (Family)  Physical Therapy Consult: No  Occupational Therapy Consult: No  Support Systems: Spouse/Significant Other (pt lives w/ spouse  in pvt residence, at baseline pt is ambulatory,iADLs, drives)  Confirm Follow Up Transport: Family  Discharge Location  Patient Expects to be Discharged to[de-identified] Home with outpatient services  DAGO Cohen

## 2022-09-14 NOTE — PROGRESS NOTES
Discharge instructions given. Pt. verbalized an understanding and documents signed. Docs. Placed in paper chart. Discharge per MD order. Patient being driven home by wife  Pt. Stable and in no apparent distress at time of discharge. IV removed, TIFFANY drain removed-tip intact. Catheter care instructions given w/ written instructions given.

## 2022-09-14 NOTE — PROGRESS NOTES
09/14/22      Medicare Outpatient Observation Notice (MOON)/ [place] Outpatient Observation Notice (Mounika Sousa) provided to patient/representative with verbal explanation of the notice. Time allotted for questions regarding the notice. Patient /representative provided a completed copy of the MOON/NHUNG notice. Copy placed on bedside chart.

## (undated) DEVICE — ADULT SPO2 SENSOR: Brand: NELLCOR

## (undated) DEVICE — STAPLER INT L340MM 45MM STD 12 FIRING B FRM PWR + GRIPPING

## (undated) DEVICE — SPONGE DRAIN NONWOVEN 4X4IN -- 2/PK

## (undated) DEVICE — RESERVOIR,SUCTION,100CC,SILICONE: Brand: MEDLINE

## (undated) DEVICE — SYRINGE CATH TIP 50ML

## (undated) DEVICE — TROCAR ENDOSCP L100MM DIA12MM BLDELSS OBT RADLUC STBL SL

## (undated) DEVICE — SEAL UNIV 5-8MM DISP BX/10 -- DA VINCI XI - SNGL USE

## (undated) DEVICE — BLADE ASSEMB CLP HAIR FINE --

## (undated) DEVICE — COVER MPLR TIP CRV SCIS ACC DA VINCI

## (undated) DEVICE — BOWL UTIL GRAD 32OZ STRL --

## (undated) DEVICE — TRANSFER SET 3": Brand: MEDLINE INDUSTRIES, INC.

## (undated) DEVICE — DRAINBAG,ANTI-REFLUX TOWER,L/F,2000ML,LL: Brand: MEDLINE

## (undated) DEVICE — CATHETER,FOLEY,SILI-ELAST,LTX,18FR,10ML: Brand: MEDLINE

## (undated) DEVICE — SUTURE VCRL SZ 4-0 L27IN ABSRB UD L19MM PS-2 3/8 CIR PRIM J426H

## (undated) DEVICE — 3M™ CUROS™ DISINFECTING CAP FOR NEEDLELESS CONNECTORS 270/CARTON 20 CARTONS/CASE CFF1-270: Brand: CUROS™

## (undated) DEVICE — CONTAINER SPEC 20 ML LID NEUT BUFF FORMALIN 10 % POLYPR STS

## (undated) DEVICE — INSUFFLATION NEEDLE: Brand: SURGINEEDLE

## (undated) DEVICE — 3M™ MEDIPORE™ H SOFT CLOTH TAPE SHORT ROLL TAPE 6INCHES X 2 YARDS 16 ROLLS/CASE 2866S: Brand: 3M™ MEDIPORE™

## (undated) DEVICE — SPONGE HEMOSTAT CELLULS 4X8IN -- SURGICEL

## (undated) DEVICE — Device

## (undated) DEVICE — CLIP INT L POLYMER LOK LIG HEM O LOK

## (undated) DEVICE — TAPE,CLOTH/SILK,CURAD,3"X10YD,LF,40/CS: Brand: CURAD

## (undated) DEVICE — CANN NASAL O2 CAPNOGRAPHY AD -- FILTERLINE

## (undated) DEVICE — SYRINGE 50ML E/T

## (undated) DEVICE — BAG SPEC BIOHZRD 10 X 10 IN --

## (undated) DEVICE — SNARE ENDOSCP M L240CM W27MM SHTH DIA2.4MM CHN 2.8MM OVL

## (undated) DEVICE — SUTURE V-LOC 90 3-0 L6IN ABSRB UD CV-23 L17MM 1/2 CIR VLOCM1904

## (undated) DEVICE — AGENT HEMSTAT 8ML FLX TIP MTRX + DISP SURGIFLO

## (undated) DEVICE — ROBOTIC GENERAL-SFMC: Brand: MEDLINE INDUSTRIES, INC.

## (undated) DEVICE — GLOVE ORANGE PI 7 1/2   MSG9075

## (undated) DEVICE — KIT COLON W/ 1.1OZ LUB AND 2 END

## (undated) DEVICE — SUTURE V-LOC 180 SZ 3-0 L6IN ABSRB GRN L17MM CV-23 1/2 CIR VLOCL0804

## (undated) DEVICE — SOLUTION IRRIG 1000ML STRL H2O USP PLAS POUR BTL

## (undated) DEVICE — SUTURE VCRL SZ 2-0 L36IN ABSRB UD L36MM CT-1 1/2 CIR J945H

## (undated) DEVICE — ARM DRAPE

## (undated) DEVICE — SUTURE VCRL SZ 2-0 L27IN ABSRB UD L26MM SH 1/2 CIR J417H

## (undated) DEVICE — 1200 GUARD II KIT W/5MM TUBE W/O VAC TUBE: Brand: GUARDIAN

## (undated) DEVICE — ELECTRO LUBE IS A SINGLE PATIENT USE DEVICE THAT IS INTENDED TO BE USED ON ELECTROSURGICAL ELECTRODES TO REDUCE STICKING.: Brand: KEY SURGICAL ELECTRO LUBE

## (undated) DEVICE — PAD POSITIONING WNG STD KIT W/BODY STRP LF DISP

## (undated) DEVICE — TUBING INSUF 0.3UM FLTR W/ LUERLOCK CONN

## (undated) DEVICE — SUTURE SZ 0 27IN 5/8 CIR UR-6  TAPER PT VIOLET ABSRB VICRYL J603H

## (undated) DEVICE — TOWEL SURG W17XL27IN STD BLU COT NONFENESTRATED PREWASHED

## (undated) DEVICE — SPONGE LAP 18X18IN STRL -- 5/PK

## (undated) DEVICE — POLYP TRAP: Brand: TRAPEASE®

## (undated) DEVICE — TISSUE RETRIEVAL SYSTEM: Brand: INZII RETRIEVAL SYSTEM

## (undated) DEVICE — SIMPLICITY FLUFF UNDERPAD 23X36, MODERATE: Brand: SIMPLICITY

## (undated) DEVICE — CATH IV AUTOGRD BC PNK 20GA 25 -- INSYTE

## (undated) DEVICE — CANISTER, RIGID, 3000CC: Brand: MEDLINE INDUSTRIES, INC.

## (undated) DEVICE — DERMABOND SKIN ADH 0.7ML -- DERMABOND ADVANCED 12/BX

## (undated) DEVICE — SUT ETHLN 2-0 18IN FS BLK --

## (undated) DEVICE — JELLY,LUBE,STERILE,FLIP TOP,TUBE,4-OZ: Brand: MEDLINE

## (undated) DEVICE — SOLIDIFIER MEDC 1200ML -- CONVERT TO 356117

## (undated) DEVICE — SPONGE LAPAROTOMY W4XL18IN WHT STRUNG RADPQ PREWASHED ST

## (undated) DEVICE — DRAIN SURG 15FR SIL RND CHN W/ TRCR FULL FLUT DBL WRP TRAD

## (undated) DEVICE — RELOAD STPL L45MM H1.5-3.6MM REG TISS BLU GRIPPING SURF B

## (undated) DEVICE — BAG BELONG PT PERS CLEAR HANDL

## (undated) DEVICE — TROCAR REPROC BLADELESS ENDOPATH XCEL 5X100MM

## (undated) DEVICE — SUTURE VCRL SZ 3-0 L27IN ABSRB UD L17MM RB-1 1/2 CIR J215H

## (undated) DEVICE — SHEARS HARM INSRT CRV 8MM -- DA VINCI XI - SNGL USE

## (undated) DEVICE — EVAC SMOKE SEECLEAR XCL -- SEE CLEAR

## (undated) DEVICE — APPLICATOR ENDOSCP 34CM -- SURGIFLO

## (undated) DEVICE — CLICKLINE SCISSORS INSERT: Brand: CLICKLINE

## (undated) DEVICE — SET GRAV CK VLV NEEDLESS ST 3 GANGED 4WAY STPCOCK HI FLO 10

## (undated) DEVICE — DISSECTOR LAP DIA5MM BLNT TIP ENDOPATH

## (undated) DEVICE — ELECTRODE,RADIOTRANSLUCENT,FOAM,3PK: Brand: MEDLINE